# Patient Record
Sex: MALE | Race: WHITE | NOT HISPANIC OR LATINO | Employment: OTHER | URBAN - METROPOLITAN AREA
[De-identification: names, ages, dates, MRNs, and addresses within clinical notes are randomized per-mention and may not be internally consistent; named-entity substitution may affect disease eponyms.]

---

## 2019-12-02 ENCOUNTER — HOSPITAL ENCOUNTER (EMERGENCY)
Facility: CLINIC | Age: 74
Discharge: HOME | End: 2019-12-02
Attending: EMERGENCY MEDICINE | Admitting: EMERGENCY MEDICINE
Payer: MEDICARE

## 2019-12-02 VITALS
WEIGHT: 140 LBS | BODY MASS INDEX: 20.04 KG/M2 | DIASTOLIC BLOOD PRESSURE: 101 MMHG | OXYGEN SATURATION: 98 % | SYSTOLIC BLOOD PRESSURE: 152 MMHG | TEMPERATURE: 98.3 F | HEIGHT: 70 IN | HEART RATE: 100 BPM | RESPIRATION RATE: 14 BRPM

## 2019-12-02 DIAGNOSIS — R45.1 AGITATION: ICD-10-CM

## 2019-12-02 PROCEDURE — 99285 EMERGENCY DEPT VISIT HI MDM: CPT

## 2019-12-02 ASSESSMENT — MIFFLIN-ST. JEOR: SCORE: 1371.29

## 2019-12-02 NOTE — ED TRIAGE NOTES
Pt arrived via EMS after an altercation at a bar. EMS reports pt called police but when police arrived he fled in vehicle. Reports when stopped his behavior became violent and unpredictable. Wife reports pt has Parkinson's disease and that his behavior has been becoming more unpredictable and violent. Pt has red marks to bilat wrists due to handcuffs. Pt seems very agitated and upset with being at the hospital and with the police.

## 2019-12-02 NOTE — ED NOTES
Pt's wife, Caren, called requesting an update. States she will be coming once she can arrange a ride with her daughter. Phone number [phone].

## 2019-12-02 NOTE — ED PROVIDER NOTES
"  History     Chief Complaint:  Altercation w/ the police           Johny Renee is a 74 year old male who presents to the emergency department for evaluation of altercation with the police. The patient states that the police brought him here. He does not want to be at the hospital and states that he was brought here involuntarily. He states that he was out driving and was at [employer]. He states that he had not drank or ate there. He denies an altercation with police or employees at [employer]. He states that they asked his wife to leave and would not serve them. He reports that he dialed 911 to report [employer].     Allergies:  Simvastatin    Medications:    Aspirin  Glyburide  Microzide  Lisinopril  Metformin  Micronase  Glucophage  Prinizide    Past Medical History:    Hypertension  Diabetes mellitus  Vitamin D deficiency  Shoulder joint pain  Hyperlipidemia  Herniated disc    Past Surgical History:    Disc surgery x2  Nasal polyp removal several times  Vasectomy    Family History:    No past pertinent family history.    Social History:  The patient presents today alone.  Former smoker  Positive for alcohol use.   Negative for drug use.  Marital Status:      The patient did not want to comply.    Review of Systems   Unable to perform ROS: Other   All other systems reviewed and are negative.        Physical Exam     Patient Vitals for the past 24 hrs:   BP Temp Temp src Pulse Heart Rate Resp SpO2 Height Weight   12/02/19 1700 (!) 152/101 -- -- 100 -- -- -- -- --   12/02/19 1658 -- -- -- -- -- -- 98 % -- --   12/02/19 1644 (!) 186/91 98.3  F (36.8  C) Oral 103 103 14 99 % 1.778 m (5' 10\") 63.5 kg (140 lb)     Physical Exam  Constitutional: Agitated. Belligerent. Yelling hallway at staff. Security at doorway.  Head: Atraumatic.  Mouth/Throat: Oropharynx is clear and moist. No oropharyngeal exudate.  Eyes: Conjunctivae and EOM are normal. No scleral icterus.  Neck: Normal range of motion. Neck supple. "   Cardiovascular: Normal rate, regular rhythm, normal heart sounds and intact distal pulses.   Pulmonary/Chest: Breath sounds normal. No respiratory distress.  Abdominal: Soft. Bowel sounds are normal. No distension. No tenderness. No rebound or guarding.   Musculoskeletal: Normal range of motion. No edema or tenderness.   Neurological: Patient is ambulatory. Does an intention and resting tremor. Appears oriented to person. Will not answer questions to place or time. Has delusional none goal oriented and very agitated answering to questions. Unable to give a reasonable timeline or historical line of events that led to his EMS ER hospitalization.  Skin: Warm and dry. No rash noted. Not diaphoretic.     Emergency Department Course     Emergency Department Course:     1748 Nursing notes and vitals reviewed.    1755 I performed an exam of the patient as documented above.     1934 Patient rechecked and updated.     Findings and plan explained to the Patient. Patient discharged home with instructions regarding supportive care, medications, and reasons to return. The importance of close follow-up was reviewed.     Impression & Plan     Medical Decision Making:  Johny Renee is a 74 year old male who presents to the emergency department today on policed hold for bizarre belligerent behavior and non compliance.  After de escalation and 360 degree evaluation form EMS police and patient wife and daughter it appears has history of being removed form [facility] in the past, was recognized again with wife, had verbal altercation with [facility] staff, called 911, a slow speed pursuit occurred, was out of control with arresting officers, wife states that he may be having a medical condition to avoid arrest and EMS brought him here.  He is refusing anything and out of control at presentation but after some time and calming he is truthful in the occurrences of the evening and on recheck with him and family agree no complaints,  completely safe and ok for home.l  Family assumes care.       Discharge Diagnosis:    ICD-10-CM    1. Agitation R45.1      Disposition:  The patient is discharged to home.    Scribe Disclosure:  I, Marvin Pardo, am serving as a scribe at 6:19 PM on 12/2/2019 to document services personally performed by Lius Mendenhall MD based on my observations and the provider's statements to me.      Luis Mendenhall MD  12/03/19 0029

## 2019-12-02 NOTE — ED AVS SNAPSHOT
[facility] Emergency Department  [address]  [facility] [address]  Phone:  [phone]  Fax:  [phone]                                    Johny Renee   MRN: [id number]    Department:  [facility] Emergency Department   Date of Visit:  12/2/2019           After Visit Summary Signature Page    I have received my discharge instructions, and my questions have been answered. I have discussed any challenges I see with this plan with the nurse or doctor.    ..........................................................................................................................................  Patient/Patient Representative Signature      ..........................................................................................................................................  Patient Representative Print Name and Relationship to Patient    ..................................................               ................................................  Date                                   Time    ..........................................................................................................................................  Reviewed by Signature/Title    ...................................................              ..............................................  Date                                               Time          22EPIC Rev 08/18

## 2019-12-03 NOTE — ED NOTES
"Pt. Became aggressive with staff stating \"If I don't see the doctor in the next ten minutes I am leaving\" pt. Was redirected into room with security and nursing staff. Pt. Became increasingly verbally aggressive with MD Mendenhall. MD mendenhall calmly redirected pt. Into room.  "

## 2020-01-01 ENCOUNTER — TRANSFERRED RECORDS (OUTPATIENT)
Dept: MULTI SPECIALTY CLINIC | Facility: CLINIC | Age: 75
End: 2020-01-01

## 2020-01-01 LAB — RETINOPATHY: NORMAL

## 2020-02-01 ENCOUNTER — HOSPITAL ENCOUNTER (INPATIENT)
Facility: CLINIC | Age: 75
LOS: 1 days | Discharge: HOME HEALTH | DRG: 897 | End: 2020-02-04
Attending: EMERGENCY MEDICINE | Admitting: INTERNAL MEDICINE
Payer: MEDICARE

## 2020-02-01 ENCOUNTER — APPOINTMENT (OUTPATIENT)
Dept: GENERAL RADIOLOGY | Facility: CLINIC | Age: 75
DRG: 897 | End: 2020-02-01
Attending: EMERGENCY MEDICINE
Payer: MEDICARE

## 2020-02-01 DIAGNOSIS — F10.920 ALCOHOLIC INTOXICATION WITHOUT COMPLICATION (H): ICD-10-CM

## 2020-02-01 DIAGNOSIS — R26.89 SHUFFLING GAIT: ICD-10-CM

## 2020-02-01 DIAGNOSIS — E87.20 LACTIC ACIDOSIS: ICD-10-CM

## 2020-02-01 DIAGNOSIS — I21.4 NSTEMI (NON-ST ELEVATED MYOCARDIAL INFARCTION) (H): ICD-10-CM

## 2020-02-01 DIAGNOSIS — J02.0 STREP THROAT: Primary | ICD-10-CM

## 2020-02-01 DIAGNOSIS — E86.0 DEHYDRATION: ICD-10-CM

## 2020-02-01 LAB
ALBUMIN SERPL-MCNC: 3.6 G/DL (ref 3.4–5)
ALCOHOL BREATH TEST: 0.11 (ref 0–0.01)
ALP SERPL-CCNC: 47 U/L (ref 40–150)
ALT SERPL W P-5'-P-CCNC: 30 U/L (ref 0–70)
ANION GAP SERPL CALCULATED.3IONS-SCNC: 12 MMOL/L (ref 3–14)
AST SERPL W P-5'-P-CCNC: 18 U/L (ref 0–45)
BASE DEFICIT BLDV-SCNC: 8.7 MMOL/L
BASOPHILS # BLD AUTO: 0 10E9/L (ref 0–0.2)
BASOPHILS NFR BLD AUTO: 0.3 %
BILIRUB SERPL-MCNC: 0.2 MG/DL (ref 0.2–1.3)
BUN SERPL-MCNC: 30 MG/DL (ref 7–30)
CALCIUM SERPL-MCNC: 8.8 MG/DL (ref 8.5–10.1)
CHLORIDE SERPL-SCNC: 114 MMOL/L (ref 94–109)
CO2 SERPL-SCNC: 16 MMOL/L (ref 20–32)
CREAT SERPL-MCNC: 1.73 MG/DL (ref 0.66–1.25)
DIFFERENTIAL METHOD BLD: ABNORMAL
EOSINOPHIL # BLD AUTO: 0 10E9/L (ref 0–0.7)
EOSINOPHIL NFR BLD AUTO: 0 %
ERYTHROCYTE [DISTWIDTH] IN BLOOD BY AUTOMATED COUNT: 12.4 % (ref 10–15)
ETHANOL SERPL-MCNC: 0.1 G/DL
GFR SERPL CREATININE-BSD FRML MDRD: 37 ML/MIN/{1.73_M2}
GLUCOSE SERPL-MCNC: 194 MG/DL (ref 70–99)
HCO3 BLDV-SCNC: 17 MMOL/L (ref 21–28)
HCT VFR BLD AUTO: 38.7 % (ref 40–53)
HGB BLD-MCNC: 12.6 G/DL (ref 13.3–17.7)
IMM GRANULOCYTES # BLD: 0.1 10E9/L (ref 0–0.4)
IMM GRANULOCYTES NFR BLD: 0.6 %
LACTATE SERPL-SCNC: 5.5 MMOL/L (ref 0.4–2)
LYMPHOCYTES # BLD AUTO: 0.9 10E9/L (ref 0.8–5.3)
LYMPHOCYTES NFR BLD AUTO: 7 %
MCH RBC QN AUTO: 31.1 PG (ref 26.5–33)
MCHC RBC AUTO-ENTMCNC: 32.6 G/DL (ref 31.5–36.5)
MCV RBC AUTO: 96 FL (ref 78–100)
MONOCYTES # BLD AUTO: 0.6 10E9/L (ref 0–1.3)
MONOCYTES NFR BLD AUTO: 4.7 %
NEUTROPHILS # BLD AUTO: 11.7 10E9/L (ref 1.6–8.3)
NEUTROPHILS NFR BLD AUTO: 87.4 %
NRBC # BLD AUTO: 0 10*3/UL
NRBC BLD AUTO-RTO: 0 /100
O2/TOTAL GAS SETTING VFR VENT: ABNORMAL %
OXYHGB MFR BLDV: 74 %
PCO2 BLDV: 34 MM HG (ref 40–50)
PH BLDV: 7.3 PH (ref 7.32–7.43)
PLATELET # BLD AUTO: 256 10E9/L (ref 150–450)
PO2 BLDV: 43 MM HG (ref 25–47)
POTASSIUM SERPL-SCNC: 5 MMOL/L (ref 3.4–5.3)
PROT SERPL-MCNC: 6.7 G/DL (ref 6.8–8.8)
RBC # BLD AUTO: 4.05 10E12/L (ref 4.4–5.9)
SODIUM SERPL-SCNC: 142 MMOL/L (ref 133–144)
TROPONIN I SERPL-MCNC: 0.06 UG/L (ref 0–0.04)
WBC # BLD AUTO: 13.4 10E9/L (ref 4–11)

## 2020-02-01 PROCEDURE — HZ2ZZZZ DETOXIFICATION SERVICES FOR SUBSTANCE ABUSE TREATMENT: ICD-10-PCS | Performed by: INTERNAL MEDICINE

## 2020-02-01 PROCEDURE — 25000128 H RX IP 250 OP 636

## 2020-02-01 PROCEDURE — 93005 ELECTROCARDIOGRAM TRACING: CPT | Mod: 76

## 2020-02-01 PROCEDURE — G0378 HOSPITAL OBSERVATION PER HR: HCPCS

## 2020-02-01 PROCEDURE — 80320 DRUG SCREEN QUANTALCOHOLS: CPT | Performed by: EMERGENCY MEDICINE

## 2020-02-01 PROCEDURE — 80053 COMPREHEN METABOLIC PANEL: CPT | Performed by: EMERGENCY MEDICINE

## 2020-02-01 PROCEDURE — 96361 HYDRATE IV INFUSION ADD-ON: CPT

## 2020-02-01 PROCEDURE — 82805 BLOOD GASES W/O2 SATURATION: CPT | Performed by: EMERGENCY MEDICINE

## 2020-02-01 PROCEDURE — 99285 EMERGENCY DEPT VISIT HI MDM: CPT | Mod: 25

## 2020-02-01 PROCEDURE — 99220 ZZC INITIAL OBSERVATION CARE,LEVL III: CPT | Performed by: INTERNAL MEDICINE

## 2020-02-01 PROCEDURE — 85025 COMPLETE CBC W/AUTO DIFF WBC: CPT | Performed by: EMERGENCY MEDICINE

## 2020-02-01 PROCEDURE — 96374 THER/PROPH/DIAG INJ IV PUSH: CPT

## 2020-02-01 PROCEDURE — 82075 ASSAY OF BREATH ETHANOL: CPT

## 2020-02-01 PROCEDURE — 84484 ASSAY OF TROPONIN QUANT: CPT | Performed by: EMERGENCY MEDICINE

## 2020-02-01 PROCEDURE — 83605 ASSAY OF LACTIC ACID: CPT | Performed by: EMERGENCY MEDICINE

## 2020-02-01 PROCEDURE — 96372 THER/PROPH/DIAG INJ SC/IM: CPT

## 2020-02-01 PROCEDURE — 71045 X-RAY EXAM CHEST 1 VIEW: CPT

## 2020-02-01 PROCEDURE — 25000128 H RX IP 250 OP 636: Performed by: EMERGENCY MEDICINE

## 2020-02-01 PROCEDURE — 25800030 ZZH RX IP 258 OP 636: Performed by: EMERGENCY MEDICINE

## 2020-02-01 PROCEDURE — 93005 ELECTROCARDIOGRAM TRACING: CPT

## 2020-02-01 RX ORDER — ASPIRIN 325 MG
325 TABLET ORAL ONCE
Status: DISCONTINUED | OUTPATIENT
Start: 2020-02-01 | End: 2020-02-02

## 2020-02-01 RX ORDER — LORAZEPAM 2 MG/ML
INJECTION INTRAMUSCULAR
Status: COMPLETED
Start: 2020-02-01 | End: 2020-02-01

## 2020-02-01 RX ORDER — LORAZEPAM 2 MG/ML
1 INJECTION INTRAMUSCULAR ONCE
Status: DISCONTINUED | OUTPATIENT
Start: 2020-02-01 | End: 2020-02-01

## 2020-02-01 RX ORDER — OLANZAPINE 10 MG/2ML
10 INJECTION, POWDER, FOR SOLUTION INTRAMUSCULAR ONCE
Status: COMPLETED | OUTPATIENT
Start: 2020-02-01 | End: 2020-02-01

## 2020-02-01 RX ORDER — LORAZEPAM 2 MG/ML
2 INJECTION INTRAMUSCULAR ONCE
Status: COMPLETED | OUTPATIENT
Start: 2020-02-01 | End: 2020-02-01

## 2020-02-01 RX ORDER — SODIUM CHLORIDE 9 MG/ML
1000 INJECTION, SOLUTION INTRAVENOUS CONTINUOUS
Status: DISCONTINUED | OUTPATIENT
Start: 2020-02-01 | End: 2020-02-02

## 2020-02-01 RX ADMIN — SODIUM CHLORIDE 1000 ML: 9 INJECTION, SOLUTION INTRAVENOUS at 19:52

## 2020-02-01 RX ADMIN — LORAZEPAM 2 MG: 2 INJECTION INTRAMUSCULAR; INTRAVENOUS at 21:34

## 2020-02-01 RX ADMIN — LORAZEPAM 2 MG: 2 INJECTION INTRAMUSCULAR at 18:17

## 2020-02-01 RX ADMIN — LORAZEPAM 2 MG: 2 INJECTION INTRAMUSCULAR; INTRAVENOUS at 18:17

## 2020-02-01 RX ADMIN — SODIUM CHLORIDE, POTASSIUM CHLORIDE, SODIUM LACTATE AND CALCIUM CHLORIDE 1000 ML: 600; 310; 30; 20 INJECTION, SOLUTION INTRAVENOUS at 21:34

## 2020-02-01 RX ADMIN — OLANZAPINE 10 MG: 10 INJECTION, POWDER, FOR SOLUTION INTRAMUSCULAR at 18:00

## 2020-02-01 NOTE — ED TRIAGE NOTES
Pt refusing to answer questions during triage. Pt arrived via EMS for evaluation of ETOH intoxication and aggressive behavior. Pt yelling and swearing at security and RN. Pt arrives from [facility] where police called for an intoxicated male causing a disturbance.

## 2020-02-01 NOTE — ED PROVIDER NOTES
History     Chief Complaint:  Alcohol Intoxication      HPI   Johny Renee is a 74 year old male who presents for evaluation of alcohol intoxication. Per EMS patient was at [facility] in [place] drinking with his wife and became increasingly agitated and aggressive, making his wife feel unsafe. Police was called at the patient was causing a disturbance. Patient arrives to the ED agitated, uncooperative, and unwilling to receive care. He denies being intoxicated.     Allergies:  No known drug allergies    Medications:    Aspirin  Glyburide  Hydrochlorothiazide  Lisinopril  Metformin  Vitamin D    Past Medical History:    DM type 2  Vitamin D deficiency  Hyperlipidemia  HTN    Past Surgical History:    Herniated disc surgery x2  Nasal polyp removal   Vasectomy    Family History:    History reviewed. No pertinent family history.     Social History:  Smoking status: former smoker  Alcohol use: yes  Drug use: no  The patient presents to the emergency department via EMS.  PCP: Joy Maya  Marital Status:       Review of Systems   Unable to perform ROS: Other (Uncooperative)       Physical Exam     Patient Vitals for the past 24 hrs:   BP Temp Temp src Pulse Heart Rate Resp SpO2   02/01/20 2134 -- 99  F (37.2  C) Tympanic -- -- -- --   02/01/20 2100 (!) 150/105 -- -- 129 126 27 93 %   02/01/20 2030 (!) 142/75 -- -- 114 -- 28 92 %   02/01/20 2020 -- -- -- -- 115 28 92 %   02/01/20 2015 -- -- -- -- 115 27 93 %   02/01/20 2010 -- -- -- -- 114 25 92 %   02/01/20 2005 -- -- -- -- 128 18 93 %   02/01/20 2000 134/71 -- -- 111 106 -- 92 %   02/01/20 1955 (!) 152/74 -- -- 111 112 28 92 %   02/01/20 1950 -- -- -- -- 113 -- 93 %   02/01/20 1945 -- -- -- -- 116 30 92 %   02/01/20 1940 -- -- -- -- 118 28 92 %   02/01/20 1935 -- -- -- -- 125 (!) 33 92 %   02/01/20 1930 -- -- -- -- 122 30 93 %   02/01/20 1925 -- -- -- -- 115 -- 93 %   02/01/20 1920 -- -- -- -- -- -- 92 %   02/01/20 1915 -- -- -- -- -- -- 92 %    02/01/20 1910 -- -- -- -- -- -- 93 %   02/01/20 1907 -- -- -- -- -- -- (!) 89 %   02/01/20 1905 -- -- -- -- -- -- 92 %   02/01/20 1900 -- -- -- -- -- -- 90 %   02/01/20 1615 (!) 132/93 -- -- 128 -- -- 97 %       Physical Exam  Constitutional:       General: He is in acute distress.      Appearance: He is well-developed.      Comments: Yelling profanity and threatening staff   HENT:      Head: Atraumatic.      Right Ear: External ear normal.      Left Ear: External ear normal.      Mouth/Throat:      Mouth: Mucous membranes are moist.      Pharynx: Oropharynx is clear. No oropharyngeal exudate.   Eyes:      General: No scleral icterus.     Conjunctiva/sclera: Conjunctivae normal.      Pupils: Pupils are equal, round, and reactive to light.   Neck:      Musculoskeletal: Normal range of motion.   Cardiovascular:      Rate and Rhythm: Regular rhythm. Tachycardia present.      Heart sounds: Normal heart sounds. No murmur. No friction rub. No gallop.    Pulmonary:      Effort: Pulmonary effort is normal. No respiratory distress.      Breath sounds: Normal breath sounds. No wheezing or rales.   Abdominal:      General: Bowel sounds are normal. There is no distension.      Palpations: Abdomen is soft. There is no mass.      Tenderness: There is no abdominal tenderness.   Skin:     General: Skin is warm and dry.      Findings: No rash.   Neurological:      Mental Status: He is alert.   Psychiatric:      Comments: Agitated, not cooperative, belligerent          Emergency Department Course   ECG (19:19:45):  Rate 118 bpm. TX interval 152. QRS duration 82. QT/QTc 314/440. P-R-T axes 51 70 32. Sinus tachycardia with premature atrial complexes. Nonspecific ST abnormality. Abnormal ECG. Interpreted at 1922 by Anirudh Luna MD.    ECG (20:32:47):  Rate 112 bpm. TX interval 146. QRS duration 84. QT/QTc 316/431. P-R-T axes 50 61 47. Sinus tachycardia with premature atrial complexes. Cannot rule out inferior infarct, age  undetermined. Abnormal ECG. Interpreted at 2035 by Anirudh Luna MD.      Laboratory:  CBC: WBC: 13.4 (H), HGB: 12.6 (L), PLT: 256  CMP: Glucose 194 (H), Chloride 4 (H), Carbon dioxide 16 (L), GFR 37 (L), Protein total 6.7 (L), o/w WNL (Creatinine: 1.73 (H))  Alcohol ethyl: 0.10 (H)  1927 Troponin: 0.058 (H)  Alcohol breath test POCT: 0.108  VBG and oxyhgb: pH 7.30 (L) / PCO2 34 (L) / PO2 43 / Bicarb 17 (L) / FlO2 2L NC / Oxyhemoglobin 74 / Base excess 8.7      Interventions:  1800 Zyprexa 10 mg IM  1817 Ativan 2 mg IM  1952 NS 1000 mL IV  1 L LR and 2mg IV ativan    Emergency Department Course:  Nursing notes and vitals reviewed. (1630) I performed an exam of the patient as documented above.     IMedicine administered as documented above. Blood drawn. This was sent to the lab for further testing, results above.     An EKG was recorded. Results as noted above.     1838 I rechecked the patient. Patient is sleeping.    2102 I rechecked the patient. Patient refused aspirin.     2103 I called the patient's wife.     2121  I consulted with Dr. Reynaga of the hospitalist services. They are in agreement to accept the patient for admission.    Findings and plan explained to the spouse who consents to admission. Discussed the patient with Dr. Reynaga, who will admit the patient to a ICU bed for further monitoring, evaluation, and treatment.    Impression & Plan    Medical Decision Making:  This patient presents from a restaurant for alcohol intoxication and being belligerent. Patient in uncooperative here and would not give us any information. He kept using profanity even after we asked him not to. He refused all intervention. It appears that when he was here a month ago the same thing happened. They just observed him until his family came to pick him up. We are trying to contact family and ask if they could come pick him up later. During our observation he became more belligerent and for our safety and his safety we  administered IM medication and the patient was put in restraints. He did calm down and was sedated somewhat. We tried to reassess him multiple times but he was still uncooperative. We eventually did get blood drawn and establish an IV. He was tachycardic the whole time so we did an EKG that showed some ST depression. He refused aspirin after we attempted to discuss in detail his elevated troponin. I talked to his wife again to update her and she tells me that his aggression is baseline and that he will not take any medication we offer him, therefore this is not new. She does not indicate that he is a chronic drinker and did say that he diagnosed with Parkinsons. She does want him admitted and be evaluated for EKG changes and elevated troponin. She is in agreement with our plan to admit to ICU due to behavioral concerns and she will be coming to see him and she understands what we have done here and has no concerns about any of our interventions so far.     Diagnosis:    ICD-10-CM    1. Alcoholic intoxication without complication (H) F10.920    2. Dehydration E86.0    3. NSTEMI (non-ST elevated myocardial infarction) (H) I21.4      Critical Care time: 30 minutes excluding procedures    Disposition:  Admitted to ICU  Chema Hooks  2/1/2020   [facility] EMERGENCY DEPARTMENT  Scribe Disclosure:  I, Chema Hooks, am serving as a scribe at 4:30 PM on 2/1/2020 to document services personally performed by Aniruhd Luna MD based on my observations and the provider's statements to me.        Anirudh Luna MD  02/01/20 3249

## 2020-02-01 NOTE — ED NOTES
Bed: ED06  Expected date: 2/1/20  Expected time: 4:13 PM  Means of arrival: Ambulance  Comments:  A515 68M ETOH

## 2020-02-02 ENCOUNTER — APPOINTMENT (OUTPATIENT)
Dept: CARDIOLOGY | Facility: CLINIC | Age: 75
DRG: 897 | End: 2020-02-02
Attending: INTERNAL MEDICINE
Payer: MEDICARE

## 2020-02-02 PROBLEM — F10.929 ALCOHOL INTOXICATION (H): Status: ACTIVE | Noted: 2020-02-02

## 2020-02-02 LAB
ALBUMIN SERPL-MCNC: 3.3 G/DL (ref 3.4–5)
ALP SERPL-CCNC: 48 U/L (ref 40–150)
ALT SERPL W P-5'-P-CCNC: 30 U/L (ref 0–70)
ANION GAP SERPL CALCULATED.3IONS-SCNC: 7 MMOL/L (ref 3–14)
AST SERPL W P-5'-P-CCNC: 43 U/L (ref 0–45)
BASOPHILS # BLD AUTO: 0.1 10E9/L (ref 0–0.2)
BASOPHILS NFR BLD AUTO: 0.4 %
BILIRUB SERPL-MCNC: 0.5 MG/DL (ref 0.2–1.3)
BUN SERPL-MCNC: 25 MG/DL (ref 7–30)
CALCIUM SERPL-MCNC: 8.6 MG/DL (ref 8.5–10.1)
CHLORIDE SERPL-SCNC: 115 MMOL/L (ref 94–109)
CO2 SERPL-SCNC: 22 MMOL/L (ref 20–32)
CREAT SERPL-MCNC: 1.38 MG/DL (ref 0.66–1.25)
DIFFERENTIAL METHOD BLD: ABNORMAL
EOSINOPHIL # BLD AUTO: 0.1 10E9/L (ref 0–0.7)
EOSINOPHIL NFR BLD AUTO: 1 %
ERYTHROCYTE [DISTWIDTH] IN BLOOD BY AUTOMATED COUNT: 12.5 % (ref 10–15)
GFR SERPL CREATININE-BSD FRML MDRD: 49 ML/MIN/{1.73_M2}
GLUCOSE BLDC GLUCOMTR-MCNC: 135 MG/DL (ref 70–99)
GLUCOSE BLDC GLUCOMTR-MCNC: 138 MG/DL (ref 70–99)
GLUCOSE BLDC GLUCOMTR-MCNC: 148 MG/DL (ref 70–99)
GLUCOSE BLDC GLUCOMTR-MCNC: 168 MG/DL (ref 70–99)
GLUCOSE BLDC GLUCOMTR-MCNC: 184 MG/DL (ref 70–99)
GLUCOSE BLDC GLUCOMTR-MCNC: 186 MG/DL (ref 70–99)
GLUCOSE SERPL-MCNC: 190 MG/DL (ref 70–99)
HBA1C MFR BLD: 7.1 % (ref 0–5.6)
HCT VFR BLD AUTO: 39.2 % (ref 40–53)
HGB BLD-MCNC: 12.7 G/DL (ref 13.3–17.7)
IMM GRANULOCYTES # BLD: 0.1 10E9/L (ref 0–0.4)
IMM GRANULOCYTES NFR BLD: 0.5 %
LACTATE BLD-SCNC: 1 MMOL/L (ref 0.7–2)
LACTATE BLD-SCNC: 1.8 MMOL/L (ref 0.7–2)
LACTATE SERPL-SCNC: 3.1 MMOL/L (ref 0.4–2)
LYMPHOCYTES # BLD AUTO: 2.1 10E9/L (ref 0.8–5.3)
LYMPHOCYTES NFR BLD AUTO: 16.7 %
MCH RBC QN AUTO: 31.2 PG (ref 26.5–33)
MCHC RBC AUTO-ENTMCNC: 32.4 G/DL (ref 31.5–36.5)
MCV RBC AUTO: 96 FL (ref 78–100)
MONOCYTES # BLD AUTO: 0.9 10E9/L (ref 0–1.3)
MONOCYTES NFR BLD AUTO: 6.8 %
MRSA DNA SPEC QL NAA+PROBE: NEGATIVE
NEUTROPHILS # BLD AUTO: 9.3 10E9/L (ref 1.6–8.3)
NEUTROPHILS NFR BLD AUTO: 74.6 %
NRBC # BLD AUTO: 0 10*3/UL
NRBC BLD AUTO-RTO: 0 /100
PLATELET # BLD AUTO: 219 10E9/L (ref 150–450)
POTASSIUM SERPL-SCNC: 4.4 MMOL/L (ref 3.4–5.3)
POTASSIUM SERPL-SCNC: 4.4 MMOL/L (ref 3.4–5.3)
PROT SERPL-MCNC: 6.2 G/DL (ref 6.8–8.8)
RBC # BLD AUTO: 4.07 10E12/L (ref 4.4–5.9)
SODIUM SERPL-SCNC: 144 MMOL/L (ref 133–144)
SPECIMEN SOURCE: NORMAL
TROPONIN I SERPL-MCNC: 1.11 UG/L (ref 0–0.04)
TROPONIN I SERPL-MCNC: 1.27 UG/L (ref 0–0.04)
WBC # BLD AUTO: 12.5 10E9/L (ref 4–11)

## 2020-02-02 PROCEDURE — 99207 ZZC CDG-CODE CATEGORY CHANGED: CPT | Performed by: INTERNAL MEDICINE

## 2020-02-02 PROCEDURE — 25000125 ZZHC RX 250: Performed by: INTERNAL MEDICINE

## 2020-02-02 PROCEDURE — 00000146 ZZHCL STATISTIC GLUCOSE BY METER IP

## 2020-02-02 PROCEDURE — 93306 TTE W/DOPPLER COMPLETE: CPT | Mod: 26 | Performed by: INTERNAL MEDICINE

## 2020-02-02 PROCEDURE — 36415 COLL VENOUS BLD VENIPUNCTURE: CPT | Performed by: INTERNAL MEDICINE

## 2020-02-02 PROCEDURE — 83605 ASSAY OF LACTIC ACID: CPT | Performed by: INTERNAL MEDICINE

## 2020-02-02 PROCEDURE — 83036 HEMOGLOBIN GLYCOSYLATED A1C: CPT | Performed by: INTERNAL MEDICINE

## 2020-02-02 PROCEDURE — 25000128 H RX IP 250 OP 636: Performed by: INTERNAL MEDICINE

## 2020-02-02 PROCEDURE — 40000264 ECHOCARDIOGRAM COMPLETE

## 2020-02-02 PROCEDURE — 80053 COMPREHEN METABOLIC PANEL: CPT | Performed by: INTERNAL MEDICINE

## 2020-02-02 PROCEDURE — G0378 HOSPITAL OBSERVATION PER HR: HCPCS

## 2020-02-02 PROCEDURE — 40000916 ZZH STATISTIC SITTER, NIGHT HOURS

## 2020-02-02 PROCEDURE — 85025 COMPLETE CBC W/AUTO DIFF WBC: CPT | Performed by: INTERNAL MEDICINE

## 2020-02-02 PROCEDURE — 25500064 ZZH RX 255 OP 636: Performed by: INTERNAL MEDICINE

## 2020-02-02 PROCEDURE — 87641 MR-STAPH DNA AMP PROBE: CPT | Performed by: INTERNAL MEDICINE

## 2020-02-02 PROCEDURE — 99226 ZZC SUBSEQUENT OBSERVATION CARE,LEVEL III: CPT | Performed by: INTERNAL MEDICINE

## 2020-02-02 PROCEDURE — 25000132 ZZH RX MED GY IP 250 OP 250 PS 637: Mod: GY | Performed by: INTERNAL MEDICINE

## 2020-02-02 PROCEDURE — 40000914 ZZH STATISTIC SITTER, DAY HOURS

## 2020-02-02 PROCEDURE — 84484 ASSAY OF TROPONIN QUANT: CPT | Performed by: INTERNAL MEDICINE

## 2020-02-02 PROCEDURE — 25800030 ZZH RX IP 258 OP 636: Performed by: INTERNAL MEDICINE

## 2020-02-02 PROCEDURE — 87640 STAPH A DNA AMP PROBE: CPT | Performed by: INTERNAL MEDICINE

## 2020-02-02 PROCEDURE — 84132 ASSAY OF SERUM POTASSIUM: CPT | Performed by: INTERNAL MEDICINE

## 2020-02-02 RX ORDER — ASPIRIN 300 MG/1
300 SUPPOSITORY RECTAL DAILY
Status: DISCONTINUED | OUTPATIENT
Start: 2020-02-03 | End: 2020-02-04 | Stop reason: HOSPADM

## 2020-02-02 RX ORDER — ACETAMINOPHEN 325 MG/1
650 TABLET ORAL EVERY 4 HOURS PRN
Status: DISCONTINUED | OUTPATIENT
Start: 2020-02-02 | End: 2020-02-04 | Stop reason: HOSPADM

## 2020-02-02 RX ORDER — LORAZEPAM 1 MG/1
1-2 TABLET ORAL EVERY 30 MIN PRN
Status: DISCONTINUED | OUTPATIENT
Start: 2020-02-02 | End: 2020-02-04

## 2020-02-02 RX ORDER — THIAMINE HYDROCHLORIDE 100 MG/ML
100 INJECTION, SOLUTION INTRAMUSCULAR; INTRAVENOUS DAILY
Status: DISCONTINUED | OUTPATIENT
Start: 2020-02-07 | End: 2020-02-04

## 2020-02-02 RX ORDER — ACETAMINOPHEN 650 MG/1
650 SUPPOSITORY RECTAL EVERY 4 HOURS PRN
Status: DISCONTINUED | OUTPATIENT
Start: 2020-02-02 | End: 2020-02-04 | Stop reason: HOSPADM

## 2020-02-02 RX ORDER — SODIUM CHLORIDE 9 MG/ML
INJECTION, SOLUTION INTRAVENOUS CONTINUOUS
Status: DISCONTINUED | OUTPATIENT
Start: 2020-02-02 | End: 2020-02-04

## 2020-02-02 RX ORDER — ONDANSETRON 4 MG/1
4 TABLET, ORALLY DISINTEGRATING ORAL EVERY 6 HOURS PRN
Status: DISCONTINUED | OUTPATIENT
Start: 2020-02-02 | End: 2020-02-04 | Stop reason: HOSPADM

## 2020-02-02 RX ORDER — ONDANSETRON 2 MG/ML
4 INJECTION INTRAMUSCULAR; INTRAVENOUS EVERY 6 HOURS PRN
Status: DISCONTINUED | OUTPATIENT
Start: 2020-02-02 | End: 2020-02-04 | Stop reason: HOSPADM

## 2020-02-02 RX ORDER — ASPIRIN 300 MG/1
300 SUPPOSITORY RECTAL ONCE
Status: COMPLETED | OUTPATIENT
Start: 2020-02-02 | End: 2020-02-02

## 2020-02-02 RX ORDER — MAGNESIUM SULFATE HEPTAHYDRATE 40 MG/ML
4 INJECTION, SOLUTION INTRAVENOUS EVERY 4 HOURS PRN
Status: DISCONTINUED | OUTPATIENT
Start: 2020-02-02 | End: 2020-02-04 | Stop reason: HOSPADM

## 2020-02-02 RX ORDER — METOPROLOL TARTRATE 1 MG/ML
2.5 INJECTION, SOLUTION INTRAVENOUS EVERY 6 HOURS
Status: DISCONTINUED | OUTPATIENT
Start: 2020-02-02 | End: 2020-02-04

## 2020-02-02 RX ORDER — DEXTROSE MONOHYDRATE 25 G/50ML
25-50 INJECTION, SOLUTION INTRAVENOUS
Status: DISCONTINUED | OUTPATIENT
Start: 2020-02-02 | End: 2020-02-04 | Stop reason: HOSPADM

## 2020-02-02 RX ORDER — NALOXONE HYDROCHLORIDE 0.4 MG/ML
.1-.4 INJECTION, SOLUTION INTRAMUSCULAR; INTRAVENOUS; SUBCUTANEOUS
Status: DISCONTINUED | OUTPATIENT
Start: 2020-02-02 | End: 2020-02-04 | Stop reason: HOSPADM

## 2020-02-02 RX ORDER — LORAZEPAM 2 MG/ML
1-2 INJECTION INTRAMUSCULAR EVERY 30 MIN PRN
Status: DISCONTINUED | OUTPATIENT
Start: 2020-02-02 | End: 2020-02-04

## 2020-02-02 RX ORDER — NICOTINE POLACRILEX 4 MG
15-30 LOZENGE BUCCAL
Status: DISCONTINUED | OUTPATIENT
Start: 2020-02-02 | End: 2020-02-04 | Stop reason: HOSPADM

## 2020-02-02 RX ADMIN — HUMAN ALBUMIN MICROSPHERES AND PERFLUTREN 3 ML: 10; .22 INJECTION, SOLUTION INTRAVENOUS at 09:00

## 2020-02-02 RX ADMIN — SODIUM CHLORIDE: 9 INJECTION, SOLUTION INTRAVENOUS at 22:42

## 2020-02-02 RX ADMIN — FOLIC ACID: 5 INJECTION, SOLUTION INTRAMUSCULAR; INTRAVENOUS; SUBCUTANEOUS at 02:05

## 2020-02-02 RX ADMIN — ASPIRIN 300 MG: 300 SUPPOSITORY RECTAL at 18:09

## 2020-02-02 RX ADMIN — LORAZEPAM 1 MG: 2 INJECTION INTRAMUSCULAR; INTRAVENOUS at 03:30

## 2020-02-02 RX ADMIN — METOPROLOL TARTRATE 2.5 MG: 5 INJECTION INTRAVENOUS at 18:13

## 2020-02-02 RX ADMIN — LORAZEPAM 1 MG: 2 INJECTION INTRAMUSCULAR; INTRAVENOUS at 06:47

## 2020-02-02 RX ADMIN — LORAZEPAM 1 MG: 2 INJECTION INTRAMUSCULAR; INTRAVENOUS at 05:39

## 2020-02-02 RX ADMIN — LORAZEPAM 2 MG: 2 INJECTION INTRAMUSCULAR; INTRAVENOUS at 22:52

## 2020-02-02 RX ADMIN — LORAZEPAM 2 MG: 2 INJECTION INTRAMUSCULAR; INTRAVENOUS at 01:24

## 2020-02-02 RX ADMIN — SODIUM CHLORIDE: 9 INJECTION, SOLUTION INTRAVENOUS at 01:53

## 2020-02-02 NOTE — ED NOTES
Patient refused to get changed out of his clothing. Patient patted down my security. pts belt, wallet, and keys placed in locker 46.

## 2020-02-02 NOTE — ED NOTES
RN called code green due to patient starting to yell at RN and security when trying to talk to him about the plan as well as the phone call the nurse had with patients wife.

## 2020-02-02 NOTE — ED NOTES
"RN went into patients room to given aspirin, patient refused stating. \"you cannot tell me if I am having a heart attack or not, you have no proof that I am having a heart attack my heart is perfectly fine. I am not fucking taking anything that you want me to. [place] and I have the freedom to make all of my decisions including what medications are going into my body.\" MD Luna notified  "

## 2020-02-02 NOTE — ED NOTES
RN called wife floresita for more information regarding today's events. Wife states that her  was at [facility] by himself and that she was out with a group of friends. Wife states that he was probably gone for a couple of hours prior to him arriving at the ER. Wife states that he was just here for agitation last week. Pt is normally unsteady on his feet due to his parkinsons which is confusing for some people because they believe that he is intoxicated. Wife unable to get to the hospital at this time due to them only having one car that is still at [facility] and her  has the keys with him. Wife states that their neighbor might be able to come and pick him up if able to be discharged. Wife would like to be called back with updates. MD Luna notified.

## 2020-02-02 NOTE — PROGRESS NOTES
[facility]  Hospitalist Progress Note  Emilie Macario MD 02/02/2020    Reason for Stay (Diagnosis): Belligerent and abusive behavior in the setting of alcohol intoxication         Assessment and Plan:      Summary of Stay: Johny Renee is a 74 year old male with a history of hypertension, type 2 diabetes, found on the ground outside of [facility] bar and restaurant.  Apparently the police called and he became belligerent and aggressive prompting EMS activation ultimately bring him in our emergency room.  In our emergency room he was aggressive and noncooperative, he was put in restraints, he was given multiple agents, him down including lorazepam and olanzapine, placed on a hold and admitted to the ICU on observation for close monitoring on 2/1/2020.    He has been intermittently belligerent and aggressive prompting more lorazepam, but has been calm and mostly sleepy over the course of today.  Admission labs were notable for a moderately elevated alcohol level of 0.10 (although not clear when his last drink was).    The wife is apparently a heavy alcoholic based on son and daughter report.  She does not even recall speaking with EMS last night but that is documented in their note.  She cannot recall how much he drank last night.    He has previously had a relatively heavy drinking problem head had a DUI several years ago.  Since that time he is significantly cut back on his drinking and he does not drink on a regular basis per son and daughter report.  He does however have an angry and belligerent nature in particular when it comes to the police.  He had an altercation with them in December 2019 also prompting an ER visit at which time he was released to family.    Problem List:   1. Acute alcohol intoxication: Resolved  2. Sedated: Secondary to multiple medications in the setting of belligerent and abusive and combative behavior at risk of hurting self.  3. Mental status: He is more cooperative today  but he still quite sedated, they were just can have to let him sleep it off, I do not believe that he needs a hold and so have discontinued that.  At this point time I think he just needs to wake up   4. Elevated troponin: Troponin was 0.058 and subsequently aria to 1.267.  Echocardiogram is without regional wall motion abnormalities.  This is in the setting of renal dysfunction with a BUN and creatinine of 30 and 1.73 on admission.  Difficult to interpret as I cannot get a reliable history from him.  EKGs without any acute changes, and shows sinus tachycardia with Q's in the inferior leads.  Echocardiogram was completed showing normal ejection fraction with borderline anteroapical hypokinesis.  If agreeable could pursue stress testing while in hospital if he leaves I would recommend getting a stress test in the outpatient setting.  Hard to know how much is renal dysfunction as part of this.  Start rectal aspirin, recheck trip in the morning  5. Elevated lactic acid: Likely due to metformin, acute kidney injury and adequate tissue perfusion-resolved after IV fluids  6. Kidney insufficiency of unclear duration: Admitting BUN/creatinine of 30 and 1.73, and have dropped to 25/1.4 with some IV fluids.  Recheck in the morning, continue to hold ACE inhibitor  7. Hypertension: PTA regimen with lisinopril 20 mg a day only, I felt think she has she off the drip she does goes up and down walking down up and I wonder what to do for her so she did not get any long-acting insulin today R 70 she does not go back and again okay only smokes at that she is still on the deep the drip that in setting of his kidney insufficiency.    8. Type 2 diabetes: On a combination of glyburide 5 mg daily and metformin 1 g p.o. twice daily.  Glyburide on hold due to minimal p.o. intake, and metformin on hold due to severe lactic acid elevation.  Hemoglobin A1c 7.1% which suggest reasonable home control  9. 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  I am happy to discuss it when he is in a more reasonable state  10. Shuffling gait: He is apparently been evaluated by a neurologist and does not have findings suggestive of Parkinson's disease.  I do wonder about a peripheral neuropathy in the setting of diabetes.  We need to do more thorough interview and patient better able to participate    DVT Prophylaxis: Holding on any anticoagulation and PCD's mainly because they might agitate him.  I like him to wake up and move around if possible  Code Status: Full Code  Functional Status: Typically independent, shuffles his gait  Diet: Moderate CHO diet when fully awake  Read: Not in place  Access: Single peripheral    Disposition Plan   Expected discharge in 1-2 days to prior living arrangement once clear sedating agents, a chance to discuss findings with patient and determine when stress testing should take place..     Entered: Emilie Macario 02/02/2020, 5:12 PM       Ok to transfer to cardiac tele        Interval History (Subjective):      Briefly wakes up to touch, slurring his speech but that with has been somewhat clearing through the course of today.  Although he still remains quite sedated and is been sleeping most of the day.  There is been no nausea or vomiting.  No further agitation                  Physical Exam:      Last Vital Signs:  BP (!) 177/88 (BP Location: Right arm)   Pulse 87   Temp 99.9  F (37.7  C) (Axillary)   Resp 18   Wt 97.8 kg (215 lb 9.8 oz)   SpO2 94%   BMI 30.94 kg/m      I/O: Inaccurate  Telemetry: NSR in the 90s  Weights: Not being tracked    In bed, sleeping comfortably.  Wakes up to voice and touch but only briefly.  Slurring his speech thinks he is at home in his bed.  Denies any pain heart is regular without murmurs rubs or gallops I do not appreciate any lower extremity edema his belly is soft and nontender lungs are clear to  auscultation he is moving around in bed on his own           Medications:      All current medications were reviewed with changes reflected in problem list.         Data:      All new lab and imaging data was reviewed.   Labs:  Recent Labs   Lab 02/02/20  0559      POTASSIUM 4.4   CHLORIDE 115*   CO2 22   ANIONGAP 7   *   BUN 25   CR 1.38*   GFRESTIMATED 49*   GFRESTBLACK 57*   TORY 8.6     Recent Labs   Lab 02/02/20  0559   WBC 12.5*   HGB 12.7*   HCT 39.2*   MCV 96      Troponin 0 0.058-> 1.110-> 1.267  Imaging:  Echocardiogram 2/2/2020  Interpretation Summary     The left ventricle is normal in size.  The visual ejection fraction is estimated at 60-65%.  No gross regional wall motion abnormalities except for a small amount of  borderline distal anteroapical hypokinesis.  No significant valvular heart disease.  _____________________________________________________________________________

## 2020-02-02 NOTE — ED NOTES
RN and several other staff members attempted to bargain with patient during code green to have patient get a sober ride home with his neighbor, sit in his room and metabolize the alcohol until he is under the legal limit and then he can be discharged or we can use medications to help him calm down and if needed will need to use restraints if he is not cooperative. As staff tried to release patient, patient attempted to grab onto staff. Staff again attempted to bargain with patient stating that we would need to give zyprexa if he continued to behave this way because it was unsafe for staff and himself. Patient was released from staff holding him down and staff left the room and patient got out of the bed and attempted to grab staff again. Patient was given zyprexa and retrained.

## 2020-02-02 NOTE — PLAN OF CARE
ICU End of Shift Summary.  For vital signs and complete assessments, please see documentation flowsheets.     Pertinent assessments:   Major Shift Events: Restraints removed.  Lethargic, disoriented, garbled speech, incoherent yet cooperative.  Sleeping shift.  Incontinent of urine.  Unable to swallow clear liquids.  CIWA 4.  Hold removed.  Attendant removed.    Plan (Upcoming Events): Start IV antihypertensive and  MD ASA.    Discharge/Transfer Needs:    Bedside Shift Report Completed :   Bedside Safety Check Completed:

## 2020-02-02 NOTE — PROGRESS NOTES
H+P dictated. Admitted with ETOH intoxication and unsafe behavior. No SI. Will continue hold initiated in ER.

## 2020-02-02 NOTE — PLAN OF CARE
Right wrist and Left wrist restraints initiated on patient on 2/2/2020 at 1:00 AM    Clinical Justification: Pulling lines, pulling tubes, and pulling equipment  Less Restrictive Alternative: Repositioning, Reorientation, Pain management, Re-evaluate equipment  Attending Physician Notified: MD ordered restraint,   Dr. Wilkerson  Order received: Yes    Family Notification: Other   Criteria explained to PATIENT  Patient's Response: No evidence of learning  Restraint care Plan initiated: YES     Sarai Silverman RN

## 2020-02-02 NOTE — PLAN OF CARE
"ICU End of Shift Summary.  For vital signs and complete assessments, please see documentation flowsheets.     Pertinent assessments: Oriented to self only. Tells staff \"fuck off\" \"I know my rights\". Pulling at lines. Tele SR/ST. Afebrile. Lungs clear. 2L oxymask. Incont of urine.   Major Shift Events: Admitted. Aggressive. Verbally abusive to staff  Plan (Upcoming Events): continue current plan of care  Discharge/Transfer Needs: Continue current plan of care    Bedside Shift Report Completed : Y  Bedside Safety Check Completed: Y  "

## 2020-02-02 NOTE — H&P
Admitted:     02/01/2020      CHIEF COMPLAINT:  Aggressive behavior, alcohol intoxication.      HISTORY OF PRESENT ILLNESS:  This is a 74-year-old gentleman who was brought in for with above complaints via EMS.  The patient does not divulge much history to me.  Most of the history is obtained from the ER records.  He is a 74-year-old gentleman with a history of hypertension, diabetes, who apparently was at [facility], where he had some yue or such to drink with his pals, when apparently he became ornery with some aggressive behavior, and subsequently EMS was summoned, and he was brought into the ER for further evaluation.  In the ER over here, he was seen by Dr. Luna.  Apparently, he was aggressive and noncooperative.  He was put in restraints, and I am asked to admit him.  I did call his wife briefly.  She states that there has been no confusion, but that his behavior was not out of the norm following the drinking.  She is unable to tell me what exactly he drank; however, the patient states that he drank some yue.  I discussed his care with Dr. Luna over here in the ER, and I am asked to admit him for further evaluation.      PAST MEDICAL HISTORY:  Significant for diabetes and hypertension.      PAST SURGICAL HISTORY:  Significant for vasectomy, lower back surgery.      FAMILY HISTORY:  Unable to be obtained, as the patient is noncooperative.      SOCIAL HISTORY:  Per his wife, he does not drink alcohol regularly.  He does not smoke.      HOME MEDICATIONS:  Include aspirin, glyburide, lisinopril, metformin.      REVIEW OF SYSTEMS:  As mentioned in the HPI.  He denies any chest pain or shortness of breath.  He denies any abdominal pain.  All other systems are reviewed and deemed unremarkable and negative.      PHYSICAL EXAMINATION:   VITAL SIGNS:  His temperature is 99.9, pulse is 113.  Blood pressure is 167/92, respiratory rate 30, O2 saturation is 94% on 2 liters.   GENERAL:  The patient is alert, awake,  oriented to self; however, he is unable to tell me how old.  He thinks we are in March, but is correct on the year.  He is able to correctly tell me that he is  and he resides in [place].   HEENT:  His pupils are equal, round, reactive to light.  Pharynx:  He has dry mucous membranes.   LUNGS:  Clear to auscultation anteriorly bilaterally.   HEART:  Tachycardic, S1, S2 normal.  No murmurs or gallops.   ABDOMEN:  Soft, nontender, nondistended, with hypoactive bowel sounds.   EXTREMITIES:  There is no edema.   SKIN:  There is no rash.   NEUROLOGIC:  His extraocular movements are intact.  He moves all his extremities, though he is in restraints.      LABORATORY:  Lab work obtained here shows the following:  A CMP was obtained which is grossly unremarkable other than potassium of 5.0, bicarbonate of 16, creatinine 1.73, total protein 6.7.  His LFTs are within normal limits.  His initial troponin is 0.058.  His lactic acid was 5.5.  His glucose level is 194.  His venous blood gas showed a pH of 7.30 with a pCO2 of 34.  On his CBC, his white cell count is 13.4, hemoglobin 12.6, hematocrit 38.7, platelet count of 256,000.  Absolute neutrophil count of 11.7.  His ethanol level is 0.10.  A 1-view chest x-ray was read as negative.  EKG obtained here in the ER shows sinus tachycardia at 118 beats per minute with occasional PAC.  Subsequent EKG shows sinus tachycardia with PACs at 112 beats per minute, nonspecific ST-T wave changes.      ASSESSMENT AND PLAN:   1.  Acute alcohol intoxication with resultant unruly behavior.  We will admit him under observation status.  We will give him a break off restraints to evaluate if he stays calm and cooperative if not, resume restraints.  He has been given multiple doses of Ativan here in the Emergency Room.  We will monitor him.  However, at this point in time, I do not think he is having any withdrawal symptoms.  This likely is a result of his intoxication. Will get a sitter  and continue hold that was placed in ED.  2.  Acute on chronic renal failure.  His prior creatinine in 2013 was read as 1.26 and is presently 1.73.  We will hydrate him with IV fluids.   3.  Lactic acidosis, likely due to alcohol intoxication and possibly metformin.  We will monitor.   4.  Elevated troponin, likely due to demand ischemia.  We will monitor him on telemetry and get an echocardiogram.  We will trend his troponins.      CODE STATUS:  Full code.      I will admit him under observation status.         YAMILETH HANSON MD             D: 2020   T: 2020   MT: VERNA      Name:     MOHIT ELIZALDE   MRN:      [id number]        Account:      [id number]   :      1945        Admitted:     2020                   Document: [id number]       cc: [facility]

## 2020-02-02 NOTE — PLAN OF CARE
Right wrist, Left wrist, and soft restraints restraints discontinued at 11:49 AM on 2/2/2020.    Restraint discontinue criteria met, patient is calm, cooperative and safe. Restraints removed.     Patient's Response: No evidence of learning  Family Notification: Other  Attending Physician Notified: MD ordered restraint,      Rosemarie Pritchett RN

## 2020-02-02 NOTE — PHARMACY-ADMISSION MEDICATION HISTORY
Admission medication history interview status for this patient is complete. See [facility] admission navigator for allergy information, prior to admission medications and immunization status.     Medication history interview source(s):Patient  Medication history resources (including written lists, pill bottles, clinic record):None  Primary pharmacy: [facility]     Changes made to PTA medication list:  Added: none  Deleted: vit D, hydrochlorothiazide   Changed:  Added directions/dose to all medications    Actions taken by pharmacist (provider contacted, etc):None     Additional medication history information: Patient states he only takes 3 medications and did not want any of them ordered at this time. Patient slightly altered from medications administered here and from alcohol ingestion prior to arrival. Med history collected to best of my ability and doses verified through care everwhere.     Medication reconciliation/reorder completed by provider prior to medication history?  No     Do you take OTC medications (eg tylenol, ibuprofen, fish oil, eye/ear drops, etc)? Yes     For patients on insulin therapy: No    Prior to Admission medications    Medication Sig Last Dose Taking? Auth Provider   ASPIRIN ADULT LOW STRENGTH PO Take 81 mg by mouth daily  Past Week at Unknown time Yes Reported, Patient   GLYBURIDE PO Take 5 mg by mouth daily (with breakfast)  2/1/2020 at Unknown time Yes Reported, Patient   lisinopril (PRINIVIL/ZESTRIL) 20 MG tablet Take 20 mg by mouth daily  2/1/2020 at am Yes Reported, Patient   metFORMIN (GLUCOPHAGE) 1000 MG tablet Take 1,000 mg by mouth 2 times daily (with meals)  2/1/2020 at am Yes Reported, Patient

## 2020-02-02 NOTE — ED NOTES
"RN went into patients room to talk to him, patient yelling at RN stating \"there is no fucking reason that I need to be tied down to this bed or gotten shots in my legs, you people seem to think that you have control over me because I am intoxicated. Call the police to bring me home.\" RN explained to patient that staff tried to get him to stop yelling and swearing at staff for 20 minutes and it was unsuccessful. Patient continued swearing at RN after explanation. MD amaral notified.   "

## 2020-02-03 LAB
ANION GAP SERPL CALCULATED.3IONS-SCNC: 5 MMOL/L (ref 3–14)
BASOPHILS # BLD AUTO: 0 10E9/L (ref 0–0.2)
BASOPHILS NFR BLD AUTO: 0.3 %
BUN SERPL-MCNC: 18 MG/DL (ref 7–30)
CALCIUM SERPL-MCNC: 8.4 MG/DL (ref 8.5–10.1)
CHLORIDE SERPL-SCNC: 113 MMOL/L (ref 94–109)
CO2 SERPL-SCNC: 24 MMOL/L (ref 20–32)
CREAT SERPL-MCNC: 1.25 MG/DL (ref 0.66–1.25)
DIFFERENTIAL METHOD BLD: ABNORMAL
EOSINOPHIL # BLD AUTO: 0.1 10E9/L (ref 0–0.7)
EOSINOPHIL NFR BLD AUTO: 0.7 %
ERYTHROCYTE [DISTWIDTH] IN BLOOD BY AUTOMATED COUNT: 12.4 % (ref 10–15)
GFR SERPL CREATININE-BSD FRML MDRD: 55 ML/MIN/{1.73_M2}
GLUCOSE BLDC GLUCOMTR-MCNC: 146 MG/DL (ref 70–99)
GLUCOSE BLDC GLUCOMTR-MCNC: 172 MG/DL (ref 70–99)
GLUCOSE BLDC GLUCOMTR-MCNC: 178 MG/DL (ref 70–99)
GLUCOSE BLDC GLUCOMTR-MCNC: 193 MG/DL (ref 70–99)
GLUCOSE BLDC GLUCOMTR-MCNC: 243 MG/DL (ref 70–99)
GLUCOSE SERPL-MCNC: 184 MG/DL (ref 70–99)
HCT VFR BLD AUTO: 36.7 % (ref 40–53)
HGB BLD-MCNC: 12.1 G/DL (ref 13.3–17.7)
IMM GRANULOCYTES # BLD: 0.1 10E9/L (ref 0–0.4)
IMM GRANULOCYTES NFR BLD: 0.6 %
INTERPRETATION ECG - MUSE: NORMAL
INTERPRETATION ECG - MUSE: NORMAL
LYMPHOCYTES # BLD AUTO: 1.5 10E9/L (ref 0.8–5.3)
LYMPHOCYTES NFR BLD AUTO: 12.3 %
MCH RBC QN AUTO: 30.6 PG (ref 26.5–33)
MCHC RBC AUTO-ENTMCNC: 33 G/DL (ref 31.5–36.5)
MCV RBC AUTO: 93 FL (ref 78–100)
MONOCYTES # BLD AUTO: 1 10E9/L (ref 0–1.3)
MONOCYTES NFR BLD AUTO: 8.3 %
NEUTROPHILS # BLD AUTO: 9.3 10E9/L (ref 1.6–8.3)
NEUTROPHILS NFR BLD AUTO: 77.8 %
NRBC # BLD AUTO: 0 10*3/UL
NRBC BLD AUTO-RTO: 0 /100
PLATELET # BLD AUTO: 226 10E9/L (ref 150–450)
POTASSIUM SERPL-SCNC: 4.5 MMOL/L (ref 3.4–5.3)
RBC # BLD AUTO: 3.96 10E12/L (ref 4.4–5.9)
SODIUM SERPL-SCNC: 142 MMOL/L (ref 133–144)
TROPONIN I SERPL-MCNC: 1.06 UG/L (ref 0–0.04)
WBC # BLD AUTO: 12 10E9/L (ref 4–11)

## 2020-02-03 PROCEDURE — 00000146 ZZHCL STATISTIC GLUCOSE BY METER IP

## 2020-02-03 PROCEDURE — 80048 BASIC METABOLIC PNL TOTAL CA: CPT | Performed by: INTERNAL MEDICINE

## 2020-02-03 PROCEDURE — 84484 ASSAY OF TROPONIN QUANT: CPT | Performed by: INTERNAL MEDICINE

## 2020-02-03 PROCEDURE — 85025 COMPLETE CBC W/AUTO DIFF WBC: CPT | Performed by: INTERNAL MEDICINE

## 2020-02-03 PROCEDURE — 25000125 ZZHC RX 250: Performed by: INTERNAL MEDICINE

## 2020-02-03 PROCEDURE — G0378 HOSPITAL OBSERVATION PER HR: HCPCS

## 2020-02-03 PROCEDURE — 12000000 ZZH R&B MED SURG/OB

## 2020-02-03 PROCEDURE — 25000132 ZZH RX MED GY IP 250 OP 250 PS 637: Mod: GY | Performed by: INTERNAL MEDICINE

## 2020-02-03 PROCEDURE — 99233 SBSQ HOSP IP/OBS HIGH 50: CPT | Performed by: INTERNAL MEDICINE

## 2020-02-03 PROCEDURE — 25000132 ZZH RX MED GY IP 250 OP 250 PS 637: Mod: GY | Performed by: PSYCHIATRY & NEUROLOGY

## 2020-02-03 PROCEDURE — 36415 COLL VENOUS BLD VENIPUNCTURE: CPT | Performed by: INTERNAL MEDICINE

## 2020-02-03 PROCEDURE — 25000128 H RX IP 250 OP 636: Performed by: INTERNAL MEDICINE

## 2020-02-03 PROCEDURE — 25800030 ZZH RX IP 258 OP 636: Performed by: INTERNAL MEDICINE

## 2020-02-03 RX ORDER — CARBIDOPA AND LEVODOPA 25; 100 MG/1; MG/1
1 TABLET ORAL 3 TIMES DAILY
Status: DISCONTINUED | OUTPATIENT
Start: 2020-02-03 | End: 2020-02-04 | Stop reason: HOSPADM

## 2020-02-03 RX ORDER — LISINOPRIL 20 MG/1
20 TABLET ORAL DAILY
Status: DISCONTINUED | OUTPATIENT
Start: 2020-02-03 | End: 2020-02-04 | Stop reason: HOSPADM

## 2020-02-03 RX ADMIN — METFORMIN HYDROCHLORIDE 1000 MG: 500 TABLET ORAL at 13:14

## 2020-02-03 RX ADMIN — LISINOPRIL 20 MG: 20 TABLET ORAL at 13:14

## 2020-02-03 RX ADMIN — FOLIC ACID: 5 INJECTION, SOLUTION INTRAMUSCULAR; INTRAVENOUS; SUBCUTANEOUS at 01:50

## 2020-02-03 RX ADMIN — CARBIDOPA AND LEVODOPA 1 TABLET: 25; 100 TABLET ORAL at 22:41

## 2020-02-03 RX ADMIN — METOPROLOL TARTRATE 2.5 MG: 5 INJECTION INTRAVENOUS at 06:48

## 2020-02-03 RX ADMIN — CARBIDOPA AND LEVODOPA 1 TABLET: 25; 100 TABLET ORAL at 18:51

## 2020-02-03 RX ADMIN — METFORMIN HYDROCHLORIDE 1000 MG: 500 TABLET ORAL at 17:43

## 2020-02-03 RX ADMIN — METOPROLOL TARTRATE 2.5 MG: 5 INJECTION INTRAVENOUS at 00:26

## 2020-02-03 ASSESSMENT — ACTIVITIES OF DAILY LIVING (ADL)
ADLS_ACUITY_SCORE: 22
ADLS_ACUITY_SCORE: 20
ADLS_ACUITY_SCORE: 22

## 2020-02-03 NOTE — PLAN OF CARE
PRIMARY DIAGNOSIS: ETOH  OUTPATIENT/OBSERVATION GOALS TO BE MET BEFORE DISCHARGE:  1. ADLs back to baseline: No    2. Activity and level of assistance: not up yet    3. Pain status: Pain free.    4. Return to near baseline physical activity: No     Discharge Planner Nurse   Safe discharge environment identified: No  Barriers to discharge: Yes       Entered by: Nely Rdz 02/02/2020 11:14 PM     Please review provider order for any additional goals.   Nurse to notify provider when observation goals have been met and patient is ready for discharge.

## 2020-02-03 NOTE — PLAN OF CARE
PRIMARY DIAGNOSIS: ETOH   OUTPATIENT/OBSERVATION GOALS TO BE MET BEFORE DISCHARGE:  1. ADLs back to baseline: No    2. Activity and level of assistance: lift    3. Pain status: Pain free.    4. Return to near baseline physical activity: No     Discharge Planner Nurse   Safe discharge environment identified: No  Barriers to discharge: Yes       Entered by: Nely Rdz 02/03/2020 4:38 AM     Please review provider order for any additional goals.   Nurse to notify provider when observation goals have been met and patient is ready for discharge.    VS: high bp's 175/80- scheduled metoprolol given, max temp 100.8, CIWA scores 11, 6, 6  Orientation: oriented to self only, paranoid, refusing some treatments, sitter in room  Glucose checks: refused at bedtime, 172 overnight  Activity: moving independently in bed  Diet: mod carb, have not been taking in PO  GI: WDL  : incontinent  Respiratory: clear lungs, 2LNC  IV: NS @100, banana bag every 24 hours  Plan: Possibility to have inpatient stress test vs outpatient stress test

## 2020-02-03 NOTE — PROGRESS NOTES
[facility]  Hospitalist Progress Note  Elle Palomo MD 02/03/2020    Reason for Stay (Diagnosis): Belligerent and abusive behavior in the setting of alcohol intoxication         Assessment and Plan:      Summary of Stay: Johny Renee is a 74 year old male with a history of hypertension, type 2 diabetes, found on the ground outside of [facility] bar and restaurant.  Apparently the police called and he became belligerent and aggressive prompting EMS activation ultimately bring him in our emergency room.  In our emergency room he was aggressive and noncooperative, he was put in restraints, he was given multiple agents, him down including lorazepam and olanzapine, placed on a hold and admitted to the ICU on observation for close monitoring on 2/1/2020.    He has been intermittently belligerent and aggressive prompting more lorazepam, but has been calm and mostly sleepy over the course of today.  Admission labs were notable for a moderately elevated alcohol level of 0.10 (although not clear when his last drink was).    The wife is also alcoholic and didn't remember how much he drank. He has previously had a relatively heavy drinking problem head had a DUI several years ago. He does have an angry and belligerent nature in particular when it comes to the police.  He had an altercation with them in December 2019 also prompting an ER visit at which time he was released to family.    Problem List:   1. Acute alcohol intoxication: Resolved. He is intermittently agitated but not in withdrawal.    2. Alcohol abuse: Will consult CD for counseling and recommendations.    3. Intermittent agitation, behavioral issues: He is more cooperative today. He is not on hold. He stated that he has anger outburst at times. Currently appears calm. Will consult psych for ecvaluation for behavioral issues.     4. Elevated troponin: Troponin was 0.058 and subsequently aria to 1.267. Troponin now down to 1.056.  Echocardiogram  is without regional wall motion abnormalities.  This is in the setting of renal dysfunction with a BUN and creatinine of 30 and 1.73 on admission. EKGs without any acute changes.  Echocardiogram showed normal ejection fraction with borderline anteroapical hypokinesis. Consider stress testing while in hospital if he leaves I would recommend getting a stress test in the outpatient setting.  Continue daily aspirin    5. Elevated lactic acid: Likely due to metformin, acute kidney injury and adequate tissue perfusion-resolved after IV fluids    6. DOMITILA: Admitting BUN/creatinine of 30 and 1.73  -Creatinine down to 1.25 today with IV fluids. Will monitor renal function.     7. Hypertension: PTA regimen with lisinopril 20 mg daily. Kidney function improved.  Will resume lisinopril. Will monitor BP.       8. Type 2 diabetes: On a combination of glyburide 5 mg daily and metformin 1 g p.o. twice daily.  Glyburide on hold due to minimal p.o. intake. Will resume metformin as lactic acidosis and DOMITILA improved. Hemoglobin A1c 7.1% which suggest reasonable home control    9. 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 Shuffling gait: He is apparently been evaluated by a neurologist and does not have findings suggestive of Parkinson's disease. He stated that he has been having shuffling gait for about two years and his symptoms are getting worse. He stated that his father had Parkinson's. Will consult neurology for evaluation and consideration of treatment for Parkinson's.     DVT Prophylaxis: Holding on any anticoagulation and PCD's mainly because they might agitate him.  I like him to wake up and move around if possible  Code Status: Full Code  Functional Status: Typically independent, shuffles his gait  Diet: Moderate CHO diet   Read: Not in place  Access: Single peripheral    Disposition Plan   Expected discharge in 1-2 days to prior  living arrangement once more alert and cooperative. Psych and neurology consulted. Also consulted physical therapy for discharge recommendations.        Entered: Elle Palomo MD 02/03/2020, 11:33 AM           Interval History (Subjective):      Patient seen and examined today. He stated that he is feeling better. He claims that he has intermittent agitation and also complains of anger outburst at times. He has history of alcohol abuse                  Physical Exam:      Last Vital Signs:  BP (!) 156/69 (BP Location: Right arm)   Pulse 87   Temp 97.5  F (36.4  C) (Axillary)   Resp 28   Wt 97.8 kg (215 lb 9.8 oz)   SpO2 96%   BMI 30.94 kg/m      I/O: Inaccurate  Telemetry: NSR in the 90s  Weights: Not being tracked    In bed, sleeping comfortably.  Wakes up to voice and touch but only briefly.  Slurring his speech thinks he is at home in his bed.  Denies any pain heart is regular without murmurs rubs or gallops I do not appreciate any lower extremity edema his belly is soft and nontender lungs are clear to auscultation he is moving around in bed on his own           Medications:      All current medications were reviewed with changes reflected in problem list.         Data:      All new lab and imaging data was reviewed.   Labs:  Recent Labs   Lab 02/03/20  0721      POTASSIUM 4.5   CHLORIDE 113*   CO2 24   ANIONGAP 5   *   BUN 18   CR 1.25   GFRESTIMATED 55*   GFRESTBLACK 64   TORY 8.4*     Recent Labs   Lab 02/03/20  0721   WBC 12.0*   HGB 12.1*   HCT 36.7*   MCV 93      Troponin 0 0.058-> 1.110-> 1.267  Imaging:  Echocardiogram 2/2/2020  Interpretation Summary     The left ventricle is normal in size.  The visual ejection fraction is estimated at 60-65%.  No gross regional wall motion abnormalities except for a small amount of  borderline distal anteroapical hypokinesis.  No significant valvular heart  disease.  _____________________________________________________________________________

## 2020-02-03 NOTE — PLAN OF CARE
PRIMARY DIAGNOSIS: ETOH  OUTPATIENT/OBSERVATION GOALS TO BE MET BEFORE DISCHARGE:  1. ADLs back to baseline: No    2. Activity and level of assistance: lift    3. Pain status: Pain free.    4. Return to near baseline physical activity: No     Discharge Planner Nurse   Safe discharge environment identified: No  Barriers to discharge: Yes       Entered by: Nely Rdz 02/03/2020 2:52 AM     Please review provider order for any additional goals.   Nurse to notify provider when observation goals have been met and patient is ready for discharge.

## 2020-02-03 NOTE — CONSULTS
"Neurology Consult Note  [facility]       [February 3, 2020]                                                                                       Admission Date: 2020  Hospital Day: 3  Code Status: Full Code    Patient: Johny Renee      : 1945  MRN:  [id number]     CC:      Chief Complaint   Patient presents with     Alcohol Intoxication       Consult Request:  Referring Provider:  Elle Palomo MD    Indication for Consultation: Neurology IP Consult: General (non-stroke/non-ICU); Patient to be seen: Routine - within 24 hours; shuffling gait. family history of parkinsons.  [CON9] (Order [id number])     Primary Care Provider:  Didier JAIME        HPI:  Johny Renee is a 74 year old yo RH male admitted for a behavorial outburst while intoxicated with ethanol. Testing found elevated troponins. I have been asked to evaluate him for Parkinson disease.     He was previously referred by his primary care physician (Dr. Velásquez) to my partner, Dr. Alan Mcarthur, to evaluate for Parkinson disease, but he cancelled his appointment (5/15/2019) stating that he did not wish to complete his new patient intake form, or pay for the appointment. When I introduced myself to him he did not wish to have the evaluation today, as he was concerned that he would have \"Parkinson disease\" on his record, and this was not acceptable to him. Eventually he agreed to permit me to examine him. He reports that his father was diagnosed with Parkinson disease and had notable tremor and slow gait, eventually ending up in a wheelchair. He was prescribed Sinemet, but the patient and his wife report that the medication did not seem to notably improve his symptoms. Interestingly, the patient feels that he has Parkinson disease, and apprently requested Sinemet from his pharmacist, who declined to provide the medication with a script. The patient has been noted to have a slow, shuffling gait for more " than a year, but no tremor.    A complete review of symptoms was performed including vascular, infectious, cardiovascular, pulmonary, gastrointestinal, endocrinological, hematologic, dermatologic, musculoskeletal, and neurological. All were normal except as above.    CURRENT PROBLEM LIST:  Patient Active Problem List    Diagnosis Date Noted     Alcohol intoxication (H) 02/02/2020     Priority: Medium       PAST MEDICAL HISTORY:  ALLERGIES: No Known Allergies  Tobacco:    History   Smoking Status     Former Smoker     Packs/day: 1.00     Years: 15.00     Types: Cigarettes   Smokeless Tobacco     Not on file     Alcohol:  Social History    Substance and Sexual Activity      Alcohol use: Yes        Comment: 1 drink daily    MEDICATIONS:       CURRENTLY SCHEDULED MEDICATIONS     aspirin  300 mg Rectal Daily     insulin aspart  1-7 Units Subcutaneous TID AC     insulin aspart  1-5 Units Subcutaneous At Bedtime     lisinopril  20 mg Oral Daily     metFORMIN  1,000 mg Oral BID w/meals     metoprolol  2.5 mg Intravenous Q6H     IV Fluid with vitamins   Intravenous Q24H     [START ON 2/7/2020] thiamine  100 mg Intravenous Daily          HOME MEDICATIONS  Medications Prior to Admission   Medication Sig Dispense Refill Last Dose     ASPIRIN ADULT LOW STRENGTH PO Take 81 mg by mouth daily    Past Week at Unknown time     GLYBURIDE PO Take 5 mg by mouth daily (with breakfast)    2/1/2020 at Unknown time     lisinopril (PRINIVIL/ZESTRIL) 20 MG tablet Take 20 mg by mouth daily    2/1/2020 at am     metFORMIN (GLUCOPHAGE) 1000 MG tablet Take 1,000 mg by mouth 2 times daily (with meals)    2/1/2020 at am     MEDICAL HISTORY  Past Medical History:   Diagnosis Date     Diabetes mellitus (H)      Hypertension      SURGICAL HISTORY  Past Surgical History:   Procedure Laterality Date     BACK SURGERY      disc surgery x2     ENT SURGERY      nasal polyp removal several times     GENITOURINARY SURGERY      vasectomy     FAMILY HISTORY   "  No family history on file.  SOCIAL HISTORY  Social History     Socioeconomic History     Marital status:      Spouse name: Not on file     Number of children: Not on file     Years of education: Not on file     Highest education level: Not on file   Occupational History     Not on file   Social Needs     Financial resource strain: Not on file     Food insecurity:     Worry: Not on file     Inability: Not on file     Transportation needs:     Medical: Not on file     Non-medical: Not on file   Tobacco Use     Smoking status: Former Smoker     Packs/day: 1.00     Years: 15.00     Pack years: 15.00     Types: Cigarettes   Substance and Sexual Activity     Alcohol use: Yes     Comment: 1 drink daily     Drug use: No     Sexual activity: Not on file   Lifestyle     Physical activity:     Days per week: Not on file     Minutes per session: Not on file     Stress: Not on file   Relationships     Social connections:     Talks on phone: Not on file     Gets together: Not on file     Attends Anabaptism service: Not on file     Active member of club or organization: Not on file     Attends meetings of clubs or organizations: Not on file     Relationship status: Not on file     Intimate partner violence:     Fear of current or ex partner: Not on file     Emotionally abused: Not on file     Physically abused: Not on file     Forced sexual activity: Not on file   Other Topics Concern     Parent/sibling w/ CABG, MI or angioplasty before 65F 55M? Not Asked   Social History Narrative     Not on file         GENERAL EXAMINATION:    He is an elderly, well-developed, well-nourished man, 5' 10\" and 215 lbs 9.76 oz. Blood pressure is 147/85. Peripheral pulses are intact at 87 and regular. He has no carotid bruits. Conjunctiva are normal. His oropharynx is without lesions or inflammation. Skin examination is unremarkable. He has no pretibial edema, rashes, or unusual lesions. Nail examination shows no clubbing, cyanosis, or " "deformities. Respiratory examination is unremarkable, with rate of 28, unlabored. He is afebrile.               Temp: 97.5  F (36.4  C)   Weight: 97.8 kg (215 lb 9.8 oz)    Temp src: Axillary         BP: (!) 147/85   Heart Rate: 85     Estimated body mass index is 30.94 kg/m  as calculated from the following:    Height as of 19: 1.778 m (5' 10\").    Weight as of this encounter: 97.8 kg (215 lb 9.8 oz).    Resp: 28   SpO2: 96 %   O2 Device: Nasal cannula     Blood Pressure:   BP Readings from Last 3 Encounters:   20 (!) 147/85   19 (!) 152/101   05/10/12 143/83       T24 : Temp (24hrs), Av.8  F (37.1  C), Min:97.5  F (36.4  C), Max:100.8  F (38.2  C)     NEUROLOGICAL EXAMINATION  Mental Status:  He is awake, alert, and interactive. He is  Mildly con fused, and blames it on the sedating medication he has been given earlier today. His speech is spontaneous and fluent. He has no aphasia or dysarthria. He tends to be suspicious of the motivation of others.    Station and Gait: He stands with assistance. He has a shuffling gait, drags his right leg/foot. He has mild retropulsion, and holds on to the walker in the hospital (he uses no assistive device at home). He turns en bloc. He has moderate gait instability.    Skull and Spine: His head is atraumatic. His C-spine is non-tender to palpation.     Cranial Nerves: Visual fields are full. Extraocular movements are intact. Pursuits are smooth and saccades are of normal speed. He has no nystagmus in the horizontal or vertical planes. His pupils are reactive. His face is symmetric at rest and with movement. He has no ptosis. He has reduced blink frequency. His tongue is midline. Shoulder shrug is symmetric.  Hearing is intact.    Motor: Muscle bulk is preserved. He has rigidity in his arms, somewhat more pronounced on the left, and in both legs, worse on the right.  strength is intact. He has no tremor at rest or with arms outstretched. He has " difficulty extending his arms, and can't straighten his elbows. He reports right more than left shoulder pain limiting his arm extension. He has cogwheeling in both arms, increased when he rapidly taps his thigh with his contralateral hand.     Sensory: Sensation is symmetric in his arms and legs.     Coordination: He has no resting, postural, or action tremor.       .  LABORATORY RESULTS      SMA-7:  Recent Labs   Lab Test 02/03/20  0721 02/02/20  0559 02/02/20  0201 02/01/20 1927    144  --  142   POTASSIUM 4.5 4.4 4.4 5.0   CHLORIDE 113* 115*  --  114*   CO2 24 22  --  16*   * 190*  --  194*   BUN 18 25  --  30   CR 1.25 1.38*  --  1.73*   TORY 8.4* 8.6  --  8.8     No results for input(s): MAG in the last 06771 hours.  No results for input(s): PHOS in the last 95148 hours.0.98 mg (actual weight)  CMP:  Recent Labs   Lab 02/02/20  0559 02/01/20 1927   PROTTOTAL 6.2* 6.7*   ALBUMIN 3.3* 3.6   ALKPHOS 48 47   AST 43 18   ALT 30 30   BILITOTAL 0.5 0.2     CBC:    Recent Labs   Lab 02/03/20  0721 02/02/20  0559 02/01/20 1927   WBC 12.0* 12.5* 13.4*   RBC 3.96* 4.07* 4.05*   HGB 12.1* 12.7* 12.6*   HCT 36.7* 39.2* 38.7*   MCV 93 96 96    219 256     HgA1c:   Hemoglobin A1C   Date Value Ref Range Status   02/02/2020 7.1 (H) 0 - 5.6 % Final     Comment:     Normal <5.7% Prediabetes 5.7-6.4%  Diabetes 6.5% or higher - adopted from ADA   consensus guidelines.       Recent Labs   Lab 02/03/20 0721   TROPI 1.056*     Lab Results   Component Value Date    TROPI 1.056 () 02/03/2020    TROPI 1.267 () 02/02/2020    TROPI 1.110 () 02/02/2020        COAGULATION PANEL  --MTHFR:     Lab Results   Component Value Date    PATH  05/10/2012     Patient Name: MOHIT ELIZALDE  MR#: [id number]  Specimen #: [id number]  Collected: 5/10/2012  Received: 5/10/2012  Reported: 5/11/2012 16:56  Ordering Phy(s): JENNIFFER CHRISTY              SPECIMEN(S):  A: Rectal polyp at 5cm  B: Rectosigmoid polyp at 15cm  C: Proximal  "descending polyp at 50cm  D: Proximal ascending polyp  E: Mid ascending polyp  F: Distal transverse polyp    FINAL DIAGNOSIS:  A.  Rectum, polypectomy - Tubulovillous adenoma, no evidence of  malignancy.    B. Colon, rectosigmoid, polypectomy - Tubular adenoma, hyperplastic  polyp.    C.  Colon, at 50 cm polypectomy - Tubular adenoma.    D.  Colon, ascending, polypectomy - Fragments of villous adenoma, no  evidence of malignancy.    E.   Colon, mid ascending, polypectomy - Tubulovillous adenoma.    F.  Colon, distal transverse, polypectomy - Tubular adenoma,  nonneoplastic colonic mucosa.      COMMENT:    Specimens C and D consist of multiple fragments. As such, more severe  disease cannot be excluded in any residual lesion.      Electronically signed out by:    Feliciano Tay M.D.      CLINICAL HISTORY:  Family history of colon cancer.  Possible followup to colon polyps.      GROSS:  A.  The specimen is labeled \"rectal polyp at 5 cm\".  The specimen  consists of a red rubbery dome-shaped polyp (1.1 x 0.9 x 0.7 cm).  The  specimen is inked black, serially sectioned, and entirely submitted.    B.  The specimen is labeled \"rectosigmoid polyp x 2 at 15 cm\".  The  specimen consists of 2 pink-tan polyps ranging from 0.4 cm up to 0.6 x  0.4 x 0.2 cm.  The latter polyp is inked black.  The specimens are  entirely submitted.    C.  The specimen is labeled \"proximal descending colon polyp at 50 cm\".  The specimen consists of 3 pink tissue fragments ranging from 0.2 cm up  to 0.4 cm.  The specimens are entirely submitted.    D.  The specimen is labeled \"proximal ascending colon polyp x 2\".  The  specimen consists of 4 red polypoid fragments ranging from 0.3 cm up to  0.8 x 0.5 x 0.4 cm.  The largest polyp is inked black and bisected.  The  specimens are entirely submitted.    E.  The specimen is labeled \"mid ascending colon polyp\".  The specimen  consists of a red rubbery nodular polyp (0.8 x 0.7 x 0.5 cm).  The  specimen " "is inked black, bisected and entirely submitted.    F.  The specimen is labeled \"distal transverse polyp x 2\".  The specimen  consists of 2 pink polypoid structures ranging from 0.2 cm up to 0.4 cm.   The specimens are entirely submitted.  Si  TRS/tw    MICROSCOPIC:  A - F. A formal microscopic examination is performed.    BCT/gwen  2012      TESTING LAB LOCATION:  [address]  [id number] [phone]    COLLECTION SITE:  Client: [facility]  Location: [facility] (S)     ABG:   Recent Labs   Lab 20  2110   O2PER 2L NC     Blood Cultures: No results for input(s): CULT in the last 168 hours.    IMAGING RESULTS   Echocardiogram Complete    Result Date: 2020  [id number]^MARGIE^YAMILETH^IRIS    [facility] Echocardiography Laboratory [address] [place] [address]   Name: MOHIT ELIZALDE MRN: [id number] : 1945 Study Date: 2020 08:36 AM Age: 74 yrs Gender: Male Patient Location: [facility] Reason For Study: Abn EKG Ordering Physician: YAMILETH HANSON Referring Physician: Joy Maya Performed By: Yadira Hu RDCS  BSA: 2.1 m2 Height: 70 in Weight: 210 lb HR: 94 BP: 147/84 mmHg _____________________________________________________________________________ __   Procedure Complete Portable Echo Adult. Optison (NDC #[id number]) given intravenously. _____________________________________________________________________________ __   Interpretation Summary  The left ventricle is normal in size. The visual ejection fraction is estimated at 60-65%. No gross regional wall motion abnormalities except for a small amount of borderline distal anteroapical hypokinesis. No significant valvular heart disease. _____________________________________________________________________________ __   Left Ventricle The left ventricle is normal in size. There is normal left ventricular wall thickness. The " visual ejection fraction is estimated at 60-65%. Diastolic Doppler findings (E/E' ratio and/or other parameters) suggest left ventricular filling pressures are increased. No gross regional wall motion abnormalities except for a small amount of borderline distal anteroapical hypokinesis.  Right Ventricle The right ventricle is normal in size and function.  Atria Normal left atrial size. Right atrial size is normal. There is no color Doppler evidence of an atrial shunt.  Mitral Valve The mitral valve leaflets are mildly thickened. There is mild (1+) mitral regurgitation.   Tricuspid Valve There is trace tricuspid regurgitation. Right ventricular systolic pressure could not be approximated due to inadequate tricuspid regurgitation.  Aortic Valve There is trivial trileaflet aortic sclerosis. No aortic regurgitation is present.  Pulmonic Valve There is trace pulmonic valvular regurgitation.  Vessels Normal size aorta. The aortic root is normal size.  Pericardium There is no pericardial effusion.   Rhythm Sinus rhythm was noted. _____________________________________________________________________________ __ MMode/2D Measurements & Calculations IVSd: 1.1 cm  LVIDd: 4.9 cm LVIDs: 3.1 cm LVPWd: 1.0 cm FS: 37.4 % LV mass(C)d: 197.1 grams LV mass(C)dI: 92.5 grams/m2 Ao root diam: 3.6 cm LA dimension: 3.6 cm asc Aorta Diam: 3.4 cm LA/Ao: 0.98 LVOT diam: 2.0 cm LVOT area: 3.1 cm2 LA Volume (BP): 54.0 ml LA Volume Index (BP): 25.4 ml/m2 RWT: 0.42    Doppler Measurements & Calculations MV E max inessa: 92.8 cm/sec MV A max inessa: 72.6 cm/sec MV E/A: 1.3 MV dec time: 0.14 sec Ao V2 max: 144.2 cm/sec Ao max P.0 mmHg E/E' av.2 Lateral E/e': 8.4 Medial E/e': 16.1    _____________________________________________________________________________ __    Report approved by: Ivan Dawn 2020 10:25 AM      Xr Chest Port 1 View    Result Date: 2020  EXAM: XR CHEST PORT 1 VW LOCATION: [facility] DATE/TIME:  2/1/2020 8:38 PM INDICATION: Chest pain COMPARISON: None.     IMPRESSION: Negative chest.  ______________________________________________    EKG:        ASSESSMENT     1. Shuffling gait, dragging his right leg, with rigidity in his arms and legs, without tremor. 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 Despite his lack of tremor, he has history and findings consistent with probable parkinson disease.   2. Gait instability. He may have cervical myelopathy contributing, but this can be pursued as an outpatient.    RECOMMENDATIONS   1. I recommend a diagnostic/threapeuric trial of Sinemet 25/100, to be given before dinner. Ideally, the medication should be given an hour before meals, or two hours after meals. If he can take the medication tonight, I recommend he stand and walk (with assistance/supervision) ~an hour after taking the medication, to see if his movements improve, with less rigidity and more fluidity to movement. He should continue 25/100 tid, one hour prior to meals.  2. I recommend follow-up in clinic for further evaluation and treatment recommendations. He can call [phone] to see up an appointment.        Nader Traore M.D., Ph.D.    [facility]

## 2020-02-03 NOTE — PLAN OF CARE
ICU End of Shift Summary.  For vital signs and complete assessments, please see documentation flowsheets.     Pertinent assessments: Lethargic. Oriented to self only. Denies pain. Tele SR. Son called and updated.  Major Shift Events: transfer to [facility]  Plan (Upcoming Events): Home when awake  Discharge/Transfer Needs: Home    Bedside Shift Report Completed : Y  Bedside Safety Check Completed: Y

## 2020-02-04 ENCOUNTER — APPOINTMENT (OUTPATIENT)
Dept: PHYSICAL THERAPY | Facility: CLINIC | Age: 75
DRG: 897 | End: 2020-02-04
Attending: INTERNAL MEDICINE
Payer: MEDICARE

## 2020-02-04 VITALS
RESPIRATION RATE: 18 BRPM | OXYGEN SATURATION: 94 % | SYSTOLIC BLOOD PRESSURE: 132 MMHG | WEIGHT: 215.61 LBS | HEART RATE: 87 BPM | TEMPERATURE: 97.7 F | DIASTOLIC BLOOD PRESSURE: 66 MMHG | BODY MASS INDEX: 30.94 KG/M2

## 2020-02-04 LAB
ANION GAP SERPL CALCULATED.3IONS-SCNC: 5 MMOL/L (ref 3–14)
BASOPHILS # BLD AUTO: 0.1 10E9/L (ref 0–0.2)
BASOPHILS NFR BLD AUTO: 0.5 %
BUN SERPL-MCNC: 20 MG/DL (ref 7–30)
CALCIUM SERPL-MCNC: 8.5 MG/DL (ref 8.5–10.1)
CHLORIDE SERPL-SCNC: 114 MMOL/L (ref 94–109)
CO2 SERPL-SCNC: 23 MMOL/L (ref 20–32)
CREAT SERPL-MCNC: 1.32 MG/DL (ref 0.66–1.25)
DEPRECATED S PYO AG THROAT QL EIA: ABNORMAL
DIFFERENTIAL METHOD BLD: ABNORMAL
EOSINOPHIL # BLD AUTO: 0.6 10E9/L (ref 0–0.7)
EOSINOPHIL NFR BLD AUTO: 6.2 %
ERYTHROCYTE [DISTWIDTH] IN BLOOD BY AUTOMATED COUNT: 12.4 % (ref 10–15)
GFR SERPL CREATININE-BSD FRML MDRD: 52 ML/MIN/{1.73_M2}
GLUCOSE BLDC GLUCOMTR-MCNC: 151 MG/DL (ref 70–99)
GLUCOSE BLDC GLUCOMTR-MCNC: 155 MG/DL (ref 70–99)
GLUCOSE BLDC GLUCOMTR-MCNC: 234 MG/DL (ref 70–99)
GLUCOSE SERPL-MCNC: 162 MG/DL (ref 70–99)
HCT VFR BLD AUTO: 37.1 % (ref 40–53)
HGB BLD-MCNC: 12.2 G/DL (ref 13.3–17.7)
IMM GRANULOCYTES # BLD: 0 10E9/L (ref 0–0.4)
IMM GRANULOCYTES NFR BLD: 0.4 %
LYMPHOCYTES # BLD AUTO: 1.5 10E9/L (ref 0.8–5.3)
LYMPHOCYTES NFR BLD AUTO: 15.6 %
MCH RBC QN AUTO: 31.3 PG (ref 26.5–33)
MCHC RBC AUTO-ENTMCNC: 32.9 G/DL (ref 31.5–36.5)
MCV RBC AUTO: 95 FL (ref 78–100)
MONOCYTES # BLD AUTO: 0.9 10E9/L (ref 0–1.3)
MONOCYTES NFR BLD AUTO: 9.6 %
NEUTROPHILS # BLD AUTO: 6.6 10E9/L (ref 1.6–8.3)
NEUTROPHILS NFR BLD AUTO: 67.7 %
NRBC # BLD AUTO: 0 10*3/UL
NRBC BLD AUTO-RTO: 0 /100
PLATELET # BLD AUTO: 210 10E9/L (ref 150–450)
POTASSIUM SERPL-SCNC: 4 MMOL/L (ref 3.4–5.3)
RBC # BLD AUTO: 3.9 10E12/L (ref 4.4–5.9)
SODIUM SERPL-SCNC: 142 MMOL/L (ref 133–144)
SPECIMEN SOURCE: ABNORMAL
WBC # BLD AUTO: 9.7 10E9/L (ref 4–11)

## 2020-02-04 PROCEDURE — 25000132 ZZH RX MED GY IP 250 OP 250 PS 637: Mod: GY | Performed by: INTERNAL MEDICINE

## 2020-02-04 PROCEDURE — 97116 GAIT TRAINING THERAPY: CPT | Mod: GP | Performed by: PHYSICAL THERAPIST

## 2020-02-04 PROCEDURE — 25000128 H RX IP 250 OP 636: Performed by: INTERNAL MEDICINE

## 2020-02-04 PROCEDURE — 25800030 ZZH RX IP 258 OP 636: Performed by: INTERNAL MEDICINE

## 2020-02-04 PROCEDURE — 80048 BASIC METABOLIC PNL TOTAL CA: CPT | Performed by: INTERNAL MEDICINE

## 2020-02-04 PROCEDURE — 99207 ZZC NON-BILLABLE SERV PER CHARTING: CPT | Performed by: PSYCHOLOGIST

## 2020-02-04 PROCEDURE — 36415 COLL VENOUS BLD VENIPUNCTURE: CPT | Performed by: INTERNAL MEDICINE

## 2020-02-04 PROCEDURE — 00000146 ZZHCL STATISTIC GLUCOSE BY METER IP

## 2020-02-04 PROCEDURE — 85025 COMPLETE CBC W/AUTO DIFF WBC: CPT | Performed by: INTERNAL MEDICINE

## 2020-02-04 PROCEDURE — 97530 THERAPEUTIC ACTIVITIES: CPT | Mod: GP | Performed by: PHYSICAL THERAPIST

## 2020-02-04 PROCEDURE — 25000125 ZZHC RX 250: Performed by: INTERNAL MEDICINE

## 2020-02-04 PROCEDURE — 99239 HOSP IP/OBS DSCHRG MGMT >30: CPT | Performed by: HOSPITALIST

## 2020-02-04 PROCEDURE — 25000132 ZZH RX MED GY IP 250 OP 250 PS 637: Mod: GY | Performed by: PSYCHIATRY & NEUROLOGY

## 2020-02-04 PROCEDURE — 97161 PT EVAL LOW COMPLEX 20 MIN: CPT | Mod: GP | Performed by: PHYSICAL THERAPIST

## 2020-02-04 PROCEDURE — 25000132 ZZH RX MED GY IP 250 OP 250 PS 637: Mod: GY | Performed by: HOSPITALIST

## 2020-02-04 PROCEDURE — 87880 STREP A ASSAY W/OPTIC: CPT | Performed by: HOSPITALIST

## 2020-02-04 RX ORDER — FOLIC ACID 1 MG/1
1 TABLET ORAL DAILY
Status: DISCONTINUED | OUTPATIENT
Start: 2020-02-04 | End: 2020-02-04 | Stop reason: HOSPADM

## 2020-02-04 RX ORDER — MULTIPLE VITAMINS W/ MINERALS TAB 9MG-400MCG
1 TAB ORAL DAILY
Status: DISCONTINUED | OUTPATIENT
Start: 2020-02-04 | End: 2020-02-04 | Stop reason: HOSPADM

## 2020-02-04 RX ORDER — CARBIDOPA AND LEVODOPA 25; 100 MG/1; MG/1
1 TABLET ORAL 3 TIMES DAILY
Qty: 90 TABLET | Refills: 0 | Status: SHIPPED | OUTPATIENT
Start: 2020-02-04 | End: 2020-06-04

## 2020-02-04 RX ORDER — AMOXICILLIN 500 MG/1
500 CAPSULE ORAL EVERY 12 HOURS SCHEDULED
Status: DISCONTINUED | OUTPATIENT
Start: 2020-02-04 | End: 2020-02-04 | Stop reason: HOSPADM

## 2020-02-04 RX ORDER — AMOXICILLIN 500 MG/1
500 CAPSULE ORAL EVERY 12 HOURS
Qty: 20 CAPSULE | Refills: 0 | Status: SHIPPED | OUTPATIENT
Start: 2020-02-04 | End: 2020-06-04

## 2020-02-04 RX ORDER — LANOLIN ALCOHOL/MO/W.PET/CERES
100 CREAM (GRAM) TOPICAL DAILY
Status: DISCONTINUED | OUTPATIENT
Start: 2020-02-04 | End: 2020-02-04 | Stop reason: HOSPADM

## 2020-02-04 RX ADMIN — ACETAMINOPHEN 650 MG: 325 TABLET, FILM COATED ORAL at 10:36

## 2020-02-04 RX ADMIN — MULTIPLE VITAMINS W/ MINERALS TAB 1 TABLET: TAB at 10:36

## 2020-02-04 RX ADMIN — FOLIC ACID 1 MG: 1 TABLET ORAL at 10:36

## 2020-02-04 RX ADMIN — METOPROLOL TARTRATE 2.5 MG: 5 INJECTION INTRAVENOUS at 06:42

## 2020-02-04 RX ADMIN — METFORMIN HYDROCHLORIDE 1000 MG: 500 TABLET ORAL at 09:42

## 2020-02-04 RX ADMIN — CARBIDOPA AND LEVODOPA 1 TABLET: 25; 100 TABLET ORAL at 09:42

## 2020-02-04 RX ADMIN — FOLIC ACID: 5 INJECTION, SOLUTION INTRAMUSCULAR; INTRAVENOUS; SUBCUTANEOUS at 02:22

## 2020-02-04 RX ADMIN — THIAMINE HCL TAB 100 MG 100 MG: 100 TAB at 10:36

## 2020-02-04 RX ADMIN — AMOXICILLIN 500 MG: 500 CAPSULE ORAL at 12:03

## 2020-02-04 RX ADMIN — LISINOPRIL 20 MG: 20 TABLET ORAL at 09:42

## 2020-02-04 ASSESSMENT — ACTIVITIES OF DAILY LIVING (ADL)
ADLS_ACUITY_SCORE: 21
ADLS_ACUITY_SCORE: 19

## 2020-02-04 NOTE — PLAN OF CARE
Pt A/O x3 - disoriented to situation but easily reorientable.  Pleasant and conversational.  VSS and afebrile.  Pt denies pain.  CMS intact.  Up A1 with walker and gait belt - pt seems to be improving well with Sinemet trial.  Voiding adequately - incontinent at times.  Tolerating mod carb diet well.  .  CIWA 0.  Will continue to monitor.

## 2020-02-04 NOTE — CONSULTS
2/4/2020 chem dep consult acknowledged.  Per EHR, patient has Medicare insurance so would be limited to programs that he could attend for substance use services.  I reviewed Dr. Hoyos's note from today, he is recommending discharge to home.  At this time, if patient is interested in seeking services for substance use he can be referred to [facility] program as they will accept Medicare.  He can schedule an evaluation on an outpatient basis at discharge if desired.  I left unit , Caitlyn JERRY, a voicemail and will email her the contact number for [facility] intake.

## 2020-02-04 NOTE — CONSULTS
"This document was created with voice recognition software.  As result, there may be errors in grammar and syntax.  Please consider this when reading this document.    Psychiatric consult, under supervision of Dr. Huston, ordered by Dr. Palomo.  This was an initial consult, which took a total of 80 minutes, with half the time coordinating care.    Summary of consult  Please see the H&P for detailed information about why Johny Renee was admitted.  For the purposes of this report, it should be noted that he is a 74-year-old  male who was brought to the [facility] ED by EMS due to aggressive behavior and alcohol intoxication.  His PMH is significant for hypertension, diabetes, and lower back surgery.  He put apparently had been at a bar with friends, became upset and aggressive, and EMS was summoned.  In the ED, he was \"aggressive and uncooperative) and eventually required restraints.  His wife provided collateral information, apparently this behavior is not unusual when he has been drinking, but he also does not drink regularly.  In the ED, his blood alcohol level was 0.10.       Johny was admitted under a hold patient for behavioral stabilization and assessment of complex behavioral problems.    I was asked to see this patient to help with the hospitalist team with psychiatric/behavioral differential diagnosis, assess suicidal and behavioral risk factors, facilitate a medication consultation with Dr. Huston, and to provide input into discharge planning.     Consultation with other team members  I conferred with Dr. Green.  We mutually agreed that no follow-up with psychiatric medication or referral to therapy is indicated.  The patient did tell me that he is interested in seeing what sounds like a health psychologist for help with behavioral management of diabetes and Parkinson's, and I recommended that he follow-up with his outpatient providers for referral to a health psychologist in their healthcare " "system.  -----------------------------------------------------------------------------------------    Records reviewed   H&P including review of systems,  as well as chart review including  previous admissions, Care Everywhere records and  the pharmacy admission note.     Progress notes/consults  Once patient cleared from sedating medications, he has been pleasant, cooperative, and engaging.    Previous treatment records  He does have a history of DUI, and of \"heavy drinking\" but did cut back after his DUI.  He also has a history of difficulties with the police.  He was assessed at the [facility] ED on 12-2-19, brought in by EMS after having a conflict with the police at a restaurant, was on a police hold for allegedly \"bizarre and belligerent behavior and non-compliance.\"  He apparently had been asked to leave the restaurant in the past, restaurant staff recognized him and a conflict developed after he was told that he would not be served.  His wife apparently told police that he may have been having a medical condition to try to avoid having him arrested, and the police brought him here.    A brief review of the Care Everywhere section did not locate any obvious psychiatric/behavioral treatment records.     Collateral Information  There was a note requesting that I call his daughter, Rachel, which I did.  She reports that she has been highly concerned about Johny for many years.  She does not believe that he is alcoholic, and also does not believe that he is depressed or anxious.  She describes him as a person who grew up during the depression, on a farm, and a very harsh and strict family environment, and is very concerned about his rights and also is \"controlling and domineering.\"  She believes that his primary problem is \"anger management issues\" in the context of having difficulty accepting his stage of life.  Example, he only recently excepted the need to hire help with his yard and other more physically demanding " "chores.    Patient Report of Admission Events/Circumstances  Johny clearly minimized his behavior that resulted in the police being called, and this admission.  He stated that he was at a restaurant, \"we decided to leave, they felt that I had too much to drink, they became aggressive.\"  When asked what indicated to him that they were \"aggressive,\" his response was, \"I did not like how they talk to me.\"  He blamed the police for being admitted, saying, \"police are always confrontational, I do not do well with that.\"    Mental and Chemical Health Treatment History    Johny has no history of any psychiatric or behavioral services.  When asked whether he has a history of problems with alcohol or drugs, his initial response was, \"no, not really.\"  I knew from the hospitalist note from yesterday that he had a DWI, and when I asked him about this he grudgingly acknowledged that he indeed has had actually 3 DWIs, with the last one occurring 6 years ago.  He was mandated to a sobriety class, but has not had CD treatment.  Since then, he has been mindful of how much he drinks when he is out, and has a breathalyzer in his car.  He denies drinking excessively, and reports that he drinks about once per week or less, when he is at home he has \"up to 3 or 4 drinks\" and when he is out \"I always keep it to a maximum of 2.\"    We discussed the impact of his recent diagnosis of Parkinson's.  He acknowledged that this is very difficult for him.  He also acknowledged that his PMD had recommended that he be assessed, because his father had Parkinson's, about 2 years ago and he resisted.  He acknowledged that the outcome of consultation with the neurologist yesterday was difficult for him, I encouraged him to follow-up with outpatient neurology and he was at least willing to consider this.    Social Background  Johny has been  for 57 years.  He has 3 children.  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  He is retired.  He and his wife live in their own home, and have no concern about finances.    Behavioral Assessment  Johny greeted me in a wary manner, had several questions about the purpose of the interview, but seems satisfied by my explanation and agreed to participate in the interview.  In fact, he relaxed quickly, and by the end of the interview we were chatting comfortably and overall he seemed collaborative, although several times he was defensive about his own behavior and minimized his own contributions to interpersonal problems.  It should be noted, however, that with me, once he relaxed, he was actually engaging, even charming.  This is in striking contrast to reports of his behaviors, multiple times, that has resulted in significant interpersonal conflicts.  I believe that this pattern is typical of somebody with significant narcissistic features; when it is to their advantage to be nice, they can be charming, but when they feel that they are being mistreated, they can be belligerent and aggressive.    Johny's appearance was notable for stocky build, gray, thick hair, and a generally vigorous appearance for his age.,  Today, he was oriented to person, circumstances, place and time. Eye contact was normal Johny's speech was fluent, logical and goal-directed.  It was not pressured, and he made no unusual statements. Attention and concentration were intact.  Johny is able to recall recent and remote events accurately. Working memory is intact. Cognitive functioning was consistent, with no fluctuation.  Associations are intact. IQ and fund of knowledge are cautiously estimated to be in the Above Average range.     No unusual movements were observed. Muscle strength/tone, gait and station are deferred to the hospitalist team    Johny described his current mood as \" normal.\"  Affect ranged from somber to euthymic.  He emphatically does not endorse concerns " about depression or anxiety symptoms.  He has no history of bipolar behavioral patterns or psychotic symptoms, and there were no obvious behavioral indications of problems with basic perceptual and cognitive process.     Insight/judgement are probably at baseline, currently are reasonable, have improved since admission.     Risk Assessment  Johny has no history of suicide attempts, and denies, convincingly, current suicidal ideation.  He does have a history, including a recent episode, of interpersonal belligerence and even aggressive behavior, but currently there are no indications of danger to others.    Impressions  Strengths: Johny was collaborative and seemed open in response to my questions.  He seems willing to accept his recent diagnosis of Parkinson's, and to follow-up with a neurologist in the community and to discuss referral to a health psychologist in the healthcare system.  Liabilities: Johny clearly has chronic problems with interpersonal sensitivities, resulting in some very serious interpersonal conflicts.    Diagnoses    Intermittent explosive disorder, likely narcissistic personality features, rule out narcissistic personality disorder, diabetes, Parkinson's, and other medical problems, per hospitalist team.    Recommendations   1.  The results of this consultation support discharge to home and community resources, once medically stable.    Delmy Acosta.[email or web address]., L.P.  [phone] (cell)  [phone] (office)

## 2020-02-04 NOTE — DISCHARGE INSTRUCTIONS
Follow up with [facility]        Appointments please call [phone]

## 2020-02-04 NOTE — PLAN OF CARE
PT: Orders received. PT evaluation completed and treatment initiated. Pt lives in a house with his spouse. 2 stairs to enter with single rail. Pt reports indep with mobility at baseline, but has a walker available to use if needed.     Discharge Planner PT   Patient plan for discharge: Home  Current status: Pt educated in PT role and POC, agreeable to session. Pt completes sit<>stand with SBA, increased time/effort, and use of UEs. Pt cued for safety. Pt ambulated 180' with use of FWW and CGA progressing to SBA. Pt cued for posture, walker management, and step length. It was noted that when the pt crossed a threshold that he had more shuffling to his gait, but with cuing was able to correct. Cues for walker management needed with turning. Extensive time spent discussing discharge recommendations, safety with mobility at home (ex: reduce distractions while ambulating), and safe bathing/showering. Pt and family agreeable to plan, and without questions upon leaving the room. Pt left sitting in bedside chair, chair alarm on, family and RN present.   Barriers to return to prior living situation: None anticipated  Recommendations for discharge: Home with use of FWW and HHPT  Rationale for recommendations: Anticipate that with continued IPPT the pt will be able to complete all mobility skills required for safe discharge home. Use of walker required for safety due to shuffling gait and balance. Rec HHPT at this time as leaving the home would be a taxing effort for the pt and likely require the assist of another person.        Entered by: Katarina Tarango 02/04/2020 11:35 AM         Physical Therapy Discharge Summary    Reason for therapy discharge:    Discharged to home.    Progress towards therapy goal(s). See goals on Care Plan in [facility] electronic health record for goal details.  Goals not met.  Barriers to achieving goals:   discharge on same date as initial evaluation.    Therapy recommendation(s):    Continued therapy is  recommended.  Rationale/Recommendations:  Rec HHPT, however appears that MD ordered OPPT.

## 2020-02-04 NOTE — DISCHARGE SUMMARY
"[facility]    Discharge Summary  Hospitalist    Date of Admission:  2/1/2020  Date of Discharge:  2/4/2020  Discharging Provider: León Green MD  Date of Service (when I saw the patient): 02/04/20    Discharge Diagnoses   Alcohol use disorder with dependence, acute intoxication and withdrawal  Chronic hypertension  Type 2 diabetes mellitus  Shuffling gait possibly secondary to Parkinson's.    History of Present Illness   \"This is a 74-year-old gentleman who was brought in for with above complaints via EMS.  The patient does not divulge much history to me.  Most of the history is obtained from the ER records.  He is a 74-year-old gentleman with a history of hypertension, diabetes, who apparently was at [facility], where he had some yue or such to drink with his pals, when apparently he became ornery with some aggressive behavior, and subsequently EMS was summoned, and he was brought into the ER for further evaluation.  In the ER over here, he was seen by Dr. Luna.  Apparently, he was aggressive and noncooperative.  He was put in restraints, and I am asked to admit him.  I did call his wife briefly.  She states that there has been no confusion, but that his behavior was not out of the norm following the drinking.  She is unable to tell me what exactly he drank; however, the patient states that he drank some yue.  I discussed his care with Dr. Luna over here in the ER, and I am asked to admit him for further evaluation. \"    Hospital Course   Johny Renee is a 74 year old male with a history of hypertension, type 2 diabetes, found on the ground outside of [facility] and restaurant.  Apparently the police called and he became belligerent and aggressive prompting EMS activation ultimately bring him in our emergency room.  In our emergency room he was aggressive and noncooperative, he was put in restraints, he was given multiple agents, him down including lorazepam and olanzapine, placed on a " hold and admitted to the ICU on observation for close monitoring on 2/1/2020.     The wife is also alcoholic and didn't remember how much he drank. He has previously had a relatively heavy drinking problem head had a DUI several years ago. He does have an angry and belligerent nature in particular when it comes to the police.  He had an altercation with them in December 2019 also prompting an ER visit at which time he was released to family.     #Acute alcohol intoxication: Resolved.  On the day of discharge patient was calm and cooperative.  Suspect that patient's initial presentation was secondary to his intoxication and underlying personality.  He was seen by psychiatry.  Deemed to have intermittent explosive disorder and likely narcissistic personality features without personality disorder.  Should follow-up with outpatient provider.  No need for referral to psychiatry or medications at this point.  He did not display any significant signs of withdrawal in the 24 hours prior to discharge.  Alcohol cessation was advised.    #Strep Throat: Patient was complaining of sore throat.  He did have evidence of tonsillar exudates.  Rapid strep was performed and positive.  He was started on a course of amoxicillin which should be continued on discharge.  He reported no allergies.     #Elevated troponin: Troponin was 0.058 and subsequently aria to 1.267. Troponin now down to 1.056.  Echocardiogram is without regional wall motion abnormalities.  This is in the setting of renal dysfunction with a BUN and creatinine of 30 and 1.73 on admission. EKGs without any acute changes.  Echocardiogram showed normal ejection fraction with borderline anteroapical hypokinesis. Consider stress testing while in hospital if he leaves I would recommend getting a stress test in the outpatient setting.  Continue daily aspirin     #DOMITILA: Admitting BUN/creatinine of 30 and 1.73.  Baseline creatinine seems to be between 1.2-1.3.  Creatinine trended  down with IV fluids.  Encourage p.o. intake.     #Hypertension: PTA regimen with lisinopril 20 mg daily. Kidney function improved.  Will resume lisinopril.       #Type 2 diabetes: On a combination of glyburide 5 mg daily and metformin 1 g p.o. twice daily.  Hemoglobin A1c 7.1% which suggest reasonable home control.  Continue home regimen on discharge.     #Shuffling gait: He stated that he has been having shuffling gait for about two years and his symptoms are getting worse. He stated that his father had Parkinson's.  Neurology was consulted.  He was started on Sinemet and did note some improvement in his shuffling gait.  He should continue this on discharge and should follow-up with neurology in the next several weeks.  Was seen by PT and deemed safe for discharge home with outpatient PT. he does have a 4 wheeled walker at home and was advised to use this.    León Green MD    Code Status   Full Code       Primary Care Physician   [facility]    Physical Exam   Temp: 97.7  F (36.5  C) Temp src: Oral BP: 132/66   Heart Rate: 75 Resp: 18 SpO2: 94 % O2 Device: None (Room air)    Vitals:    02/02/20 0130   Weight: 97.8 kg (215 lb 9.8 oz)     Vital Signs with Ranges  Temp:  [97.7  F (36.5  C)-98.9  F (37.2  C)] 97.7  F (36.5  C)  Heart Rate:  [75-86] 75  Resp:  [18-24] 18  BP: (130-157)/(64-85) 132/66  SpO2:  [94 %-96 %] 94 %  I/O last 3 completed shifts:  In: 480 [P.O.:480]  Out: 650 [Urine:650]    Patient is sitting in a chair.  No acute distress.  He is comfortable.  He answers questions appropriately and he is calm.  Heart is regular in rate and rhythm without murmur.  Lungs are clear bilaterally without any increased work of breathing.  Abdomen is soft and nondistended.  Lower extremities are warm and well-perfused.  No edema.  He moves all extremities.  No tremor noted.    Discharge Disposition   Discharged to home  Condition at discharge: Stable    Consultations This Hospital Stay   PSYCHIATRY IP  CONSULT  NEUROLOGY IP CONSULT  PHYSICAL THERAPY ADULT IP CONSULT  CHEMICAL DEPENDENCY IP CONSULT    Time Spent on this Encounter   ILeón MD, personally saw the patient today and spent greater than 30 minutes discharging this patient.    Discharge Orders      Home Care PT Referral for Hospital Discharge      Reason for your hospital stay    You were hospitalized for acute alcohol intoxication and agitation.  You were monitored and treated for withdrawal.  You were also seen by neurology for concern of possible Parkinson's.  You were started on a low dose Sinemet therapy which should be continued on discharge.  You should follow-up with both your PCP and with neurology in the coming weeks.     Follow-up and recommended labs and tests     Follow up with primary care provider, [facility], within 7 days for hospital follow- up.  The following labs/tests are recommended: BMP.    You should follow-up with neurology in the next 2 to 4 weeks for follow-up of possible Parkinson's.     Activity    Your activity upon discharge: activity as tolerated     MD face to face encounter    Documentation of Face to Face and Certification for Home Health Services    I certify that patient: Johny Renee is under my care and that I, or a nurse practitioner or physician's assistant working with me, had a face-to-face encounter that meets the physician face-to-face encounter requirements with this patient on: 2/4/2020.    This encounter with the patient was in whole, or in part, for the following medical condition, which is the primary reason for home health care: Generalized weakness.    I certify that, based on my findings, the following services are medically necessary home health services: Physical Therapy.    My clinical findings support the need for the above services because: Physical Therapy Services are needed to assess and treat the following functional impairments: Generalized weakness, possible  parkinson's.    Further, I certify that my clinical findings support that this patient is homebound (i.e. absences from home require considerable and taxing effort and are for medical reasons or [facility] services or infrequently or of short duration when for other reasons) because: Leaving home is medically contraindicated for the following reason(s): generalized weakness..    Based on the above findings. I certify that this patient is confined to the home and needs intermittent skilled nursing care, physical therapy and/or speech therapy.  The patient is under my care, and I have initiated the establishment of the plan of care.  This patient will be followed by a physician who will periodically review the plan of care.  Physician/Provider to provide follow up care: Francesco, Joy Loudon    [facility] hospital physician (the Medicare certified [place] provider): León Green MD  Physician Signature: See electronic signature associated with these discharge orders.  Date: 2/4/2020     Full Code     Diet    Follow this diet upon discharge: Orders Placed This Encounter      Moderate Consistent CHO Diet     Discharge Medications   Current Discharge Medication List      START taking these medications    Details   amoxicillin (AMOXIL) 500 MG capsule Take 1 capsule (500 mg) by mouth every 12 hours for 10 days  Qty: 20 capsule, Refills: 0    Associated Diagnoses: Strep throat      carbidopa-levodopa (SINEMET)  MG tablet Take 1 tablet by mouth 3 times daily  Qty: 90 tablet, Refills: 0    Associated Diagnoses: Shuffling gait         CONTINUE these medications which have NOT CHANGED    Details   ASPIRIN ADULT LOW STRENGTH PO Take 81 mg by mouth daily       GLYBURIDE PO Take 5 mg by mouth daily (with breakfast)       lisinopril (PRINIVIL/ZESTRIL) 20 MG tablet Take 20 mg by mouth daily       metFORMIN (GLUCOPHAGE) 1000 MG tablet Take 1,000 mg by mouth 2 times daily (with meals)            Allergies   No Known  Allergies  Data   Most Recent 3 CBC's:  Recent Labs   Lab Test 02/04/20  0732 02/03/20  0721 02/02/20  0559   WBC 9.7 12.0* 12.5*   HGB 12.2* 12.1* 12.7*   MCV 95 93 96    226 219      Most Recent 3 BMP's:  Recent Labs   Lab Test 02/04/20  0732 02/03/20  0721 02/02/20  0559    142 144   POTASSIUM 4.0 4.5 4.4   CHLORIDE 114* 113* 115*   CO2 23 24 22   BUN 20 18 25   CR 1.32* 1.25 1.38*   ANIONGAP 5 5 7   TORY 8.5 8.4* 8.6   * 184* 190*     Most Recent 2 LFT's:  Recent Labs   Lab Test 02/02/20  0559 02/01/20  1927   AST 43 18   ALT 30 30   ALKPHOS 48 47   BILITOTAL 0.5 0.2     Most Recent INR's and Anticoagulation Dosing History:  Anticoagulation Dose History     There is no flowsheet data to display.        Most Recent 3 Troponin's:  Recent Labs   Lab Test 02/03/20  0721 02/02/20  1202 02/02/20  0559   TROPI 1.056* 1.267* 1.110*     Most Recent Cholesterol Panel:No lab results found.  Most Recent 6 Bacteria Isolates From Any Culture (See EPIC Reports for Culture Details):No lab results found.  Most Recent TSH, T4 and A1c Labs:  Recent Labs   Lab Test 02/02/20  0201   A1C 7.1*

## 2020-02-04 NOTE — PROGRESS NOTES
02/04/20 1108   Quick Adds   Type of Visit Initial PT Evaluation   Living Environment   Lives With spouse   Living Arrangements house   Home Accessibility stairs to enter home;stairs within home   Number of Stairs, Main Entrance 2   Stair Railings, Main Entrance railing on left side (ascending)   Number of Stairs, Within Home, Primary 10   Stair Railings, Within Home, Primary railings on both sides of stairs   Self-Care   Usual Activity Tolerance good   Current Activity Tolerance moderate   Regular Exercise No   Equipment Currently Used at Home none   Activity/Exercise/Self-Care Comment Has access to a FWW at home   Functional Level Prior   Ambulation 0-->independent   Transferring 0-->independent   Toileting 0-->independent   Bathing 0-->independent   Fall history within last six months yes   Number of times patient has fallen within last six months 1   Which of the above functional risks had a recent onset or change? ambulation;transferring;toileting;bathing;dressing  (activity tolerance)   General Information   Onset of Illness/Injury or Date of Surgery - Date 02/03/20   Referring Physician Elle Palomo MD   Patient/Family Goals Statement Discharge to home   Pertinent History of Current Problem (include personal factors and/or comorbidities that impact the POC) Johny Renee is a 74 year old male with a history of hypertension, type 2 diabetes, found on the ground outside of [employer].  Found to be in ETOH withdrawal. Now cleared. Suspect potential parkinsons as well.    General Observations Pt sitting in bedside chair, agreeable to session.    Cognitive Status Examination   Orientation orientation to person, place and time  (Not situation)   Level of Consciousness alert   Follows Commands and Answers Questions 100% of the time   Personal Safety and Judgment at risk behaviors demonstrated   Memory intact   Pain Assessment   Patient Currently in Pain No   Integumentary/Edema  "  Integumentary/Edema no deficits were identifed   Posture    Posture Forward head position;Protracted shoulders   Range of Motion (ROM)   ROM Comment WFL   Strength   Strength Comments Decreased functional strength as seen by pt's need for UE assist with transfers   Bed Mobility   Bed Mobility Comments Sit<>supine not assessed as session begins and ends in chair   Transfer Skills   Transfer Comments sit<>stand SBA   Gait   Gait Comments CGA with FWW   Balance   Balance Comments Requires B UE support for safe gait   Sensory Examination   Sensory Perception Comments Baseline numbness to finger tips   Coordination   Coordination no deficits were identified   General Therapy Interventions   Planned Therapy Interventions balance training;bed mobility training;gait training;strengthening;stretching;transfer training;home program guidelines;progressive activity/exercise   Clinical Impression   Criteria for Skilled Therapeutic Intervention yes, treatment indicated   PT Diagnosis Impaired functional mobility   Influenced by the following impairments Strength, balance, cognition/safety awareness   Functional limitations due to impairments Difficulty with sit<>stand transfers, ambulation   Clinical Presentation Stable/Uncomplicated   Clinical Presentation Rationale Medically stable, nearing discharge   Clinical Decision Making (Complexity) Low complexity   Therapy Frequency 5x/week   Predicted Duration of Therapy Intervention (days/wks) 3 days   Anticipated Discharge Disposition Home with Home Therapy   Risk & Benefits of therapy have been explained Yes   Patient, Family & other staff in agreement with plan of care Yes   [facility] [employer] TM \"6 Clicks\"   2016, Trustees of [facility], under license to [employer].  All rights reserved.   6 Clicks Short Forms Basic Mobility Inpatient Short Form   [facility] [employer]  \"6 Clicks\" V.2 Basic Mobility Inpatient Short Form   1. Turning from your back to your side " while in a flat bed without using bedrails? 4 - None   2. Moving from lying on your back to sitting on the side of a flat bed without using bedrails? 3 - A Little   3. Moving to and from a bed to a chair (including a wheelchair)? 3 - A Little   4. Standing up from a chair using your arms (e.g., wheelchair, or bedside chair)? 4 - None   5. To walk in hospital room? 3 - A Little   6. Climbing 3-5 steps with a railing? 3 - A Little   Basic Mobility Raw Score (Score out of 24.Lower scores equate to lower levels of function) 20   Total Evaluation Time   Total Evaluation Time (Minutes) 8

## 2020-02-04 NOTE — PLAN OF CARE
Pt is a/o, up with A1, gait belt and walker. VSS. BS were 155 and 234. Pain in throat area and sore when swallowing. Pt has white poxs on tosils. Rapid test for strep was positive. Was seen by PT and is agreeable to home PT. Seen by  psych he did not recommend any med changes. Patient's After Visit Summary was reviewed with patient and/or spouse and daughter [place].   Patient verbalized understanding of After Visit Summary, recommended follow up and was given an opportunity to ask questions.   Discharge medications sent home with patient/family: RX    Discharged with daughter to transport home. All belongings , clothes and cell phone sent home with pt.

## 2020-02-04 NOTE — PLAN OF CARE
Pt is alert to self and time. Up with A1 walker and gait belt. Unsteady gate- shuffles.  VSS. BS were 184,243, and 193. CIWA score was 6, 0,and 0.  At beginning of shift pt was angry irritated with staff and cares. Sitter was discontinued at 1000 and by mid day pt was pleasant, calling appropriately and making jokes. Incontinent of urine at times. Appetite is fair.  Psychology, PT and neurology consulted today. Neurology started Sinemet. Plan to see PT and Neurology tomorrow, discharge pending.

## 2020-02-05 NOTE — PROGRESS NOTES
Post discharge note:    Reynold contacted the pt to follow-up for HC needs.  The pt was discharged yesterday, but a HC agency was not identified with the pt prior to his discharge.  Reynold called the pt who agreed with HC.  He requested [facility] and said that he recently saw his PCP.    Reynold also offered CD resources and an OP CD assessment at [facility], but the pt declined saying that he doesn't need it.    Reynold spoke with [facility] who said that the pt saw his PCP, Dr. Mendoza, on 1/6/2020.  Reynold sent the referral to [facility] via email.    MAURY Springer, [facility]  Inpatient Care Coordination  [facility]  [phone]

## 2020-03-22 ENCOUNTER — HEALTH MAINTENANCE LETTER (OUTPATIENT)
Age: 75
End: 2020-03-22

## 2020-06-04 ENCOUNTER — OFFICE VISIT (OUTPATIENT)
Dept: FAMILY MEDICINE | Facility: CLINIC | Age: 75
End: 2020-06-04
Payer: MEDICARE

## 2020-06-04 VITALS
OXYGEN SATURATION: 98 % | DIASTOLIC BLOOD PRESSURE: 64 MMHG | BODY MASS INDEX: 31.42 KG/M2 | SYSTOLIC BLOOD PRESSURE: 130 MMHG | TEMPERATURE: 99.1 F | HEART RATE: 107 BPM | WEIGHT: 219 LBS

## 2020-06-04 DIAGNOSIS — I10 HTN (HYPERTENSION), BENIGN: ICD-10-CM

## 2020-06-04 DIAGNOSIS — E11.9 TYPE 2 DIABETES MELLITUS WITHOUT COMPLICATION, WITHOUT LONG-TERM CURRENT USE OF INSULIN (H): Primary | ICD-10-CM

## 2020-06-04 DIAGNOSIS — E78.2 MIXED HYPERLIPIDEMIA: ICD-10-CM

## 2020-06-04 PROCEDURE — 99204 OFFICE O/P NEW MOD 45 MIN: CPT | Performed by: FAMILY MEDICINE

## 2020-06-04 RX ORDER — ATORVASTATIN CALCIUM 20 MG/1
20 TABLET, FILM COATED ORAL DAILY
Qty: 360 TABLET | Refills: 0 | Status: SHIPPED | OUTPATIENT
Start: 2020-06-04 | End: 2020-08-24 | Stop reason: SINTOL

## 2020-06-04 RX ORDER — LISINOPRIL 20 MG/1
20 TABLET ORAL DAILY
Qty: 365 TABLET | Refills: 0 | Status: SHIPPED | OUTPATIENT
Start: 2020-06-04 | End: 2021-06-08

## 2020-06-04 RX ORDER — GLYBURIDE 5 MG/1
5 TABLET ORAL
Qty: 365 TABLET | Refills: 0 | Status: SHIPPED | OUTPATIENT
Start: 2020-06-04 | End: 2021-07-29

## 2020-06-04 SDOH — HEALTH STABILITY: MENTAL HEALTH: HOW OFTEN DO YOU HAVE A DRINK CONTAINING ALCOHOL?: 2-4 TIMES A MONTH

## 2020-06-04 NOTE — PROGRESS NOTES
Subjective     Johny Renee is a 75 year old male who presents to clinic today for the following health issues:    HPI   Diabetes Follow-up    How often are you checking your blood sugar? A few times a month  What time of day are you checking your blood sugars (select all that apply)?  Before meals  Have you had any blood sugars above 200?  No  Have you had any blood sugars below 70?  No    What symptoms do you notice when your blood sugar is low?  None    What concerns do you have today about your diabetes? None     Do you have any of these symptoms? (Select all that apply)  Numbness in feet and Burning in feet      BP Readings from Last 2 Encounters:   06/04/20 130/64   02/04/20 132/66     Hemoglobin A1C (%)   Date Value   02/02/2020 7.1 (H)               Hypertension Follow-up      Do you check your blood pressure regularly outside of the clinic? No     Are you following a low salt diet? Yes    Are your blood pressures ever more than 140 on the top number (systolic) OR more   than 90 on the bottom number (diastolic), for example 140/90? No      How many servings of fruits and vegetables do you eat daily?  2-3    On average, how many sweetened beverages do you drink each day (Examples: soda, juice, sweet tea, etc.  Do NOT count diet or artificially sweetened beverages)?   0    How many days per week do you exercise enough to make your heart beat faster? 3 or less    How many minutes a day do you exercise enough to make your heart beat faster? 9 or less    How many days per week do you miss taking your medication? 0    Denies any headaches, lightheadedness, dizziness, vision changes, chest pain, palpitations, shortness of breath, dyspnea, numbness/tingling.      Patient Active Problem List   Diagnosis     Alcohol intoxication (H)     Diabetes mellitus, type 2 (H)     HTN (hypertension), benign     Mixed hyperlipidemia     Past Surgical History:   Procedure Laterality Date     BACK SURGERY      disc surgery x2      ENT SURGERY      nasal polyp removal several times     GENITOURINARY SURGERY      vasectomy       Social History     Tobacco Use     Smoking status: Former Smoker     Packs/day: 1.00     Years: 15.00     Pack years: 15.00     Types: Cigarettes     Smokeless tobacco: Never Used     Tobacco comment: quit in the 1980s   Substance Use Topics     Alcohol use: Yes     Frequency: 2-4 times a month     Comment: 1 drink daily     Family History   Problem Relation Age of Onset     Parkinsonism Father      Diabetes Type 2  Mother            Reviewed and updated as needed this visit by Provider  Tobacco  Allergies  Meds  Problems  Med Hx  Surg Hx  Fam Hx         Review of Systems   Constitutional, HEENT, cardiovascular, pulmonary, gi and gu systems are negative, except as otherwise noted.      Objective    /64   Pulse 107   Temp 99.1  F (37.3  C) (Tympanic)   Wt 99.3 kg (219 lb)   SpO2 98%   BMI 31.42 kg/m    Body mass index is 31.42 kg/m .  Physical Exam   GENERAL: healthy, alert and no distress  RESP: lungs clear to auscultation - no rales, rhonchi or wheezes  CV: regular rate and rhythm, normal S1 S2, no S3 or S4, no murmur, click or rub, no peripheral edema and peripheral pulses strong  MS: no gross musculoskeletal defects noted, no edema  PSYCH: mentation appears normal, affect normal/bright    Diagnostic Test Results:  Labs reviewed in Epic        Assessment & Plan     1. Type 2 diabetes mellitus without complication, without long-term current use of insulin (H): well-controlled. Continue current regimen and follow up in 6 months.  - glyBURIDE (DIABETA /MICRONASE) 5 MG tablet; Take 1 tablet (5 mg) by mouth daily (with breakfast)  Dispense: 365 tablet; Refill: 0  - metFORMIN (GLUCOPHAGE) 1000 MG tablet; Take 1 tablet (1,000 mg) by mouth 2 times daily (with meals)  Dispense: 730 tablet; Refill: 0    2. HTN (hypertension), benign: stable. Continue lisinopril  - lisinopril (ZESTRIL) 20 MG tablet; Take 1  tablet (20 mg) by mouth daily  Dispense: 365 tablet; Refill: 0    3. Mixed hyperlipidemia: stable, continue Lipitor.  - atorvastatin (LIPITOR) 20 MG tablet; Take 1 tablet (20 mg) by mouth daily  Dispense: 360 tablet; Refill: 0       Return in about 6 months (around 12/4/2020) for Diabetes follow-up, BP Recheck.    León Hayden, [facility] [place]

## 2020-08-21 NOTE — PROGRESS NOTES
Subjective     Johny Renee is a 75 year old male who presents to clinic today for the following health issues:    HPI     Diabetes Follow-up    How often are you checking your blood sugar? A few times a week  What time of day are you checking your blood sugars (select all that apply)?  Before meals  Have you had any blood sugars above 200?  Yes a couple  Have you had any blood sugars below 70?  No    What symptoms do you notice when your blood sugar is low?  None    What concerns do you have today about your diabetes? None     Do you have any of these symptoms? (Select all that apply)  No numbness or tingling in feet.  No redness, sores or blisters on feet.  No complaints of excessive thirst.  No reports of blurry vision.  No significant changes to weight.    Have you had a diabetic eye exam in the last 12 months? Yes- Date of last eye exam: unable to remember,  Location: [facility] -- thinks it was in the past year    Current medications include metformin 1,000 mg PO BID, glyburide 5 mg PO daily                Hyperlipidemia Follow-Up      Are you regularly taking any medication or supplement to lower your cholesterol?   Yes- atorvastatin 20 mg PO daily    Are you having muscle aches or other side effects that you think could be caused by your cholesterol lowering medication?  Yes- having some muscle issues    Hypertension Follow-up      Do you check your blood pressure regularly outside of the clinic? No     Are you following a low salt diet? No    Are your blood pressures ever more than 140 on the top number (systolic) OR more   than 90 on the bottom number (diastolic), for example 140/90? No    BP Readings from Last 2 Encounters:   08/24/20 126/64   06/04/20 130/64     Hemoglobin A1C (%)   Date Value   02/02/2020 7.1 (H)         How many servings of fruits and vegetables do you eat daily?  0-1    On average, how many sweetened beverages do you drink each day (Examples: soda, juice, sweet  "tea, etc.  Do NOT count diet or artificially sweetened beverages)?   0    How many days per week do you exercise enough to make your heart beat faster? 3 or less    How many minutes a day do you exercise enough to make your heart beat faster? 9 or less    How many days per week do you miss taking your medication? 0    Denies any headaches, lightheadedness, dizziness, vision changes, chest pain, palpitations, shortness of breath, dyspnea, numbness/tingling.      Review of Systems   Constitutional, HEENT, cardiovascular, pulmonary, gi and gu systems are negative, except as otherwise noted.      Objective    /64   Pulse 78   Temp 98.2  F (36.8  C) (Tympanic)   Ht 1.778 m (5' 10\")   Wt 96.6 kg (213 lb)   SpO2 99%   BMI 30.56 kg/m    Body mass index is 30.56 kg/m .  Physical Exam   GENERAL: healthy, alert and no distress, shuffling gait  RESP: lungs clear to auscultation - no rales, rhonchi or wheezes  CV: regular rate and rhythm, normal S1 S2, no S3 or S4, no murmur, click or rub, no peripheral edema and peripheral pulses strong  MS: no gross musculoskeletal defects noted, no edema  Diabetic foot exam: normal DP and PT pulses, no trophic changes or ulcerative lesions and abnormal monofilament exam - no points felt          Assessment & Plan   1. Type 2 diabetes mellitus with diabetic polyneuropathy, without long-term current use of insulin (H): historically well controlled. Will try decreasing GI side effects by changing to XR formulation of metformin. Continue glyburide. Will recheck labs. He does have neuropathy and advised good footwear and keeping an eye on his feet.  Encouraged eye exam.   - FOOT EXAM  - Lipid panel reflex to direct LDL Fasting  - Albumin Random Urine Quantitative with Creat Ratio  - HEMOGLOBIN A1C  - metFORMIN (GLUCOPHAGE-XR) 500 MG 24 hr tablet; Take 2 tablets (1,000 mg) by mouth 2 times daily (with meals)  Dispense: 360 tablet; Refill: 3    2. HTN (hypertension), benign: BP well " "controlled. Continue lisinopril  - Albumin Random Urine Quantitative with Creat Ratio  - Comprehensive metabolic panel (BMP + Alb, Alk Phos, ALT, AST, Total. Bili, TP)    3. Mixed hyperlipidemia: stable. Given muscle pains, will discontinue statin and see how he he feels  - Lipid panel reflex to direct LDL Fasting    - We should consider doing a stress test in the future to evaluate his heart given past history of NSTEMI in February 2020.    - He also has shuffling gait and if no improvement in ambulation with discontinuation of statin, we should consider consultation with neurologist for Parkinsonism -- had taken Sinemet after hospitalization but discontinued due to headaches.    - We also discussed updating immunizations (PCV13, Tetanus, and getting influenza shot in the fall) -- he declined all of these.      BMI:   Estimated body mass index is 30.56 kg/m  as calculated from the following:    Height as of this encounter: 1.778 m (5' 10\").    Weight as of this encounter: 96.6 kg (213 lb).   Weight management plan: Discussed healthy diet and exercise guidelines        Return in about 6 months (around 2/24/2021) for Diabetes follow-up.    León Hayden, DO  [facility] [place]    Answers for HPI/ROS submitted by the patient on 8/24/2020   Chronic problems general questions HPI Form  If you checked off any problems, how difficult have these problems made it for you to do your work, take care of things at home, or get along with other people?: Not difficult at all  PHQ9 TOTAL SCORE: 0  ROBERT 7 TOTAL SCORE: 0    "

## 2020-08-24 ENCOUNTER — OFFICE VISIT (OUTPATIENT)
Dept: FAMILY MEDICINE | Facility: CLINIC | Age: 75
End: 2020-08-24
Payer: MEDICARE

## 2020-08-24 VITALS
TEMPERATURE: 98.2 F | HEART RATE: 78 BPM | SYSTOLIC BLOOD PRESSURE: 126 MMHG | BODY MASS INDEX: 30.49 KG/M2 | OXYGEN SATURATION: 99 % | HEIGHT: 70 IN | DIASTOLIC BLOOD PRESSURE: 64 MMHG | WEIGHT: 213 LBS

## 2020-08-24 DIAGNOSIS — I10 HTN (HYPERTENSION), BENIGN: ICD-10-CM

## 2020-08-24 DIAGNOSIS — E11.42 TYPE 2 DIABETES MELLITUS WITH DIABETIC POLYNEUROPATHY, WITHOUT LONG-TERM CURRENT USE OF INSULIN (H): Primary | ICD-10-CM

## 2020-08-24 DIAGNOSIS — E78.2 MIXED HYPERLIPIDEMIA: ICD-10-CM

## 2020-08-24 LAB — HBA1C MFR BLD: 7.3 % (ref 0–5.6)

## 2020-08-24 PROCEDURE — 80061 LIPID PANEL: CPT | Performed by: FAMILY MEDICINE

## 2020-08-24 PROCEDURE — 99214 OFFICE O/P EST MOD 30 MIN: CPT | Performed by: FAMILY MEDICINE

## 2020-08-24 PROCEDURE — 82043 UR ALBUMIN QUANTITATIVE: CPT | Performed by: FAMILY MEDICINE

## 2020-08-24 PROCEDURE — 99207 C FOOT EXAM  NO CHARGE: CPT | Performed by: FAMILY MEDICINE

## 2020-08-24 PROCEDURE — 36415 COLL VENOUS BLD VENIPUNCTURE: CPT | Performed by: FAMILY MEDICINE

## 2020-08-24 PROCEDURE — 80053 COMPREHEN METABOLIC PANEL: CPT | Performed by: FAMILY MEDICINE

## 2020-08-24 PROCEDURE — 83036 HEMOGLOBIN GLYCOSYLATED A1C: CPT | Performed by: FAMILY MEDICINE

## 2020-08-24 RX ORDER — METFORMIN HCL 500 MG
1000 TABLET, EXTENDED RELEASE 24 HR ORAL 2 TIMES DAILY WITH MEALS
Qty: 360 TABLET | Refills: 3 | Status: SHIPPED | OUTPATIENT
Start: 2020-08-24 | End: 2022-05-06

## 2020-08-24 ASSESSMENT — ANXIETY QUESTIONNAIRES
4. TROUBLE RELAXING: NOT AT ALL
6. BECOMING EASILY ANNOYED OR IRRITABLE: NOT AT ALL
GAD7 TOTAL SCORE: 0
7. FEELING AFRAID AS IF SOMETHING AWFUL MIGHT HAPPEN: NOT AT ALL
5. BEING SO RESTLESS THAT IT IS HARD TO SIT STILL: NOT AT ALL
GAD7 TOTAL SCORE: 0
GAD7 TOTAL SCORE: 0
7. FEELING AFRAID AS IF SOMETHING AWFUL MIGHT HAPPEN: NOT AT ALL
2. NOT BEING ABLE TO STOP OR CONTROL WORRYING: NOT AT ALL
1. FEELING NERVOUS, ANXIOUS, OR ON EDGE: NOT AT ALL
3. WORRYING TOO MUCH ABOUT DIFFERENT THINGS: NOT AT ALL

## 2020-08-24 ASSESSMENT — PATIENT HEALTH QUESTIONNAIRE - PHQ9
10. IF YOU CHECKED OFF ANY PROBLEMS, HOW DIFFICULT HAVE THESE PROBLEMS MADE IT FOR YOU TO DO YOUR WORK, TAKE CARE OF THINGS AT HOME, OR GET ALONG WITH OTHER PEOPLE: NOT DIFFICULT AT ALL
SUM OF ALL RESPONSES TO PHQ QUESTIONS 1-9: 0
SUM OF ALL RESPONSES TO PHQ QUESTIONS 1-9: 0

## 2020-08-24 ASSESSMENT — MIFFLIN-ST. JEOR: SCORE: 1697.41

## 2020-08-24 NOTE — PATIENT INSTRUCTIONS
Patient Education     Understanding Coronary Calcium Scan    A coronary calcium scan is a test that looks at the arteries that supply blood to the heart muscle. These are called the coronary arteries. The scan checks for plaque buildup along the walls of these arteries. Plaque is made up of calcium, fat, cholesterol, and other substances. A buildup of plaque narrows the arteries. This affects the flow of blood to the heart muscle. If a coronary artery becomes completely blocked, a heart attack (death of part of the heart muscle) can result. Knowing how much plaque you have in your arteries can help figure out your risk for a heart attack.  Why do I need a coronary calcium scan?  A coronary calcium scan measures the amount of calcium in the arteries. The presence of calcium deposits in an artery means that plaque is starting to build up.  The results of the test are given as a calcium score. The scan can help predict your risk of having a heart attack, even before symptoms appear.  This scan isn t for everyone. It is most useful when considered along with other risk factors for a heart attack. These include family history of heart disease, high blood pressure, and unhealthy cholesterol.  What happens during a coronary calcium scan?  The scan is done using computed tomography (CT). This test uses X-rays and computers to create detailed images of the heart.  For the test, you lie on a platform that slides into a tube. During the scan:    You will need to stay very still.    You may be asked to hold your breath for short periods of time.    You may have small sticky discs attached to wires (electrodes) on your chest to record your heartbeat.  The test will likely take about 10 minutes. Once the test is done and your appointment is finished, you can go back to your normal routine.  What are the risks of coronary calcium scan?  A CT scan exposes you to a certain amount of radiation. The benefits of the scan likely  outweigh the risks for you. You and your healthcare provider can discuss this further.  Date Last Reviewed: 5/1/2016 2000-2019 [employer]. [address] [place] [address], [place] [address]. All rights reserved. This information is not intended as a substitute for professional medical care. Always follow your healthcare professional's instructions.        -We are changing your metformin to an extended release formulation to hopefully help with the GI side effects.  - We should consider doing a stress test in the future to evaluate your heart  - If you don't notice improvement in your walking with discontinuation of your statin, we should consider consultation with a neurologist for Parkinsonism given your  family history.

## 2020-08-24 NOTE — LETTER
August 27, 2020      Johny Renee  [address] [place] [address]        Dear ,    We are writing to inform you of your test results.    -HDL(good) cholesterol level is low which can increase your heart disease risk.  A diet high in fat and simple carbohydrates, genetics and being overweight can contribute to this. Your LDL(bad) cholesterol and trigylceride levels are normal.  ADVISE: exercising 150 minutes of aerobic exercise per week (30 minutes 5 days per week or 50 minutes 3 days per week are options) is helpful to improve this.  In 12 months, you should recheck your fasting cholesterol panel by scheduling a lab-only appointment.   -Liver and gallbladder tests (ALT,AST, Alk phos,bilirubin) are normal.   -Kidney function (GFR) is decreased but stable. We will continue to monitor this.   -Sodium is normal.   -Potassium is normal.   -Calcium is normal.   -Glucose is elevated due to your diabetes.   -A1C (test of diabetes control the last 2-3 months) is at your goal. Please continue with your current plan. Also, you should make an appointment to see me and recheck your A1C test in 6 months.   -Microalbumin (urine protein) test is normal.  ADVISE: rechecking this annually.     Resulted Orders   Lipid panel reflex to direct LDL Fasting   Result Value Ref Range    Cholesterol 98 <200 mg/dL    Triglycerides 120 <150 mg/dL      Comment:      Fasting specimen    HDL Cholesterol 38 (L) >39 mg/dL    LDL Cholesterol Calculated 36 <100 mg/dL      Comment:      Desirable:       <100 mg/dl    Non HDL Cholesterol 60 <130 mg/dL   Albumin Random Urine Quantitative with Creat Ratio   Result Value Ref Range    Creatinine Urine 139 mg/dL    Albumin Urine mg/L 18 mg/L    Albumin Urine mg/g Cr 13.31 0 - 17 mg/g Cr   HEMOGLOBIN A1C   Result Value Ref Range    Hemoglobin A1C 7.3 (H) 0 - 5.6 %      Comment:      Normal <5.7% Prediabetes 5.7-6.4%  Diabetes 6.5% or higher - adopted from ADA   consensus guidelines.      Comprehensive metabolic panel (BMP + Alb, Alk Phos, ALT, AST, Total. Bili, TP)   Result Value Ref Range    Sodium 140 133 - 144 mmol/L    Potassium 5.2 3.4 - 5.3 mmol/L    Chloride 112 (H) 94 - 109 mmol/L    Carbon Dioxide 21 20 - 32 mmol/L    Anion Gap 7 3 - 14 mmol/L    Glucose 139 (H) 70 - 99 mg/dL      Comment:      Fasting specimen    Urea Nitrogen 31 (H) 7 - 30 mg/dL    Creatinine 1.49 (H) 0.66 - 1.25 mg/dL    GFR Estimate 44 (L) >60 mL/min/[1.73_m2]      Comment:      Non  GFR Calc  Starting 12/18/2018, serum creatinine based estimated GFR (eGFR) will be   calculated using the Chronic Kidney Disease Epidemiology Collaboration   (CKD-EPI) equation.      GFR Estimate If Black 52 (L) >60 mL/min/[1.73_m2]      Comment:       GFR Calc  Starting 12/18/2018, serum creatinine based estimated GFR (eGFR) will be   calculated using the Chronic Kidney Disease Epidemiology Collaboration   (CKD-EPI) equation.      Calcium 9.6 8.5 - 10.1 mg/dL    Bilirubin Total 0.7 0.2 - 1.3 mg/dL    Albumin 3.7 3.4 - 5.0 g/dL    Protein Total 7.2 6.8 - 8.8 g/dL    Alkaline Phosphatase 48 40 - 150 U/L    ALT 26 0 - 70 U/L    AST 17 0 - 45 U/L       If you have any questions or concerns, please call the clinic at the number listed above.       Sincerely,        León Hayden, DO

## 2020-08-25 LAB
ALBUMIN SERPL-MCNC: 3.7 G/DL (ref 3.4–5)
ALP SERPL-CCNC: 48 U/L (ref 40–150)
ALT SERPL W P-5'-P-CCNC: 26 U/L (ref 0–70)
ANION GAP SERPL CALCULATED.3IONS-SCNC: 7 MMOL/L (ref 3–14)
AST SERPL W P-5'-P-CCNC: 17 U/L (ref 0–45)
BILIRUB SERPL-MCNC: 0.7 MG/DL (ref 0.2–1.3)
BUN SERPL-MCNC: 31 MG/DL (ref 7–30)
CALCIUM SERPL-MCNC: 9.6 MG/DL (ref 8.5–10.1)
CHLORIDE SERPL-SCNC: 112 MMOL/L (ref 94–109)
CHOLEST SERPL-MCNC: 98 MG/DL
CO2 SERPL-SCNC: 21 MMOL/L (ref 20–32)
CREAT SERPL-MCNC: 1.49 MG/DL (ref 0.66–1.25)
CREAT UR-MCNC: 139 MG/DL
GFR SERPL CREATININE-BSD FRML MDRD: 44 ML/MIN/{1.73_M2}
GLUCOSE SERPL-MCNC: 139 MG/DL (ref 70–99)
HDLC SERPL-MCNC: 38 MG/DL
LDLC SERPL CALC-MCNC: 36 MG/DL
MICROALBUMIN UR-MCNC: 18 MG/L
MICROALBUMIN/CREAT UR: 13.31 MG/G CR (ref 0–17)
NONHDLC SERPL-MCNC: 60 MG/DL
POTASSIUM SERPL-SCNC: 5.2 MMOL/L (ref 3.4–5.3)
PROT SERPL-MCNC: 7.2 G/DL (ref 6.8–8.8)
SODIUM SERPL-SCNC: 140 MMOL/L (ref 133–144)
TRIGL SERPL-MCNC: 120 MG/DL

## 2020-08-25 ASSESSMENT — ANXIETY QUESTIONNAIRES: GAD7 TOTAL SCORE: 0

## 2021-01-15 ENCOUNTER — HEALTH MAINTENANCE LETTER (OUTPATIENT)
Age: 76
End: 2021-01-15

## 2021-01-21 ENCOUNTER — OFFICE VISIT (OUTPATIENT)
Dept: FAMILY MEDICINE | Facility: CLINIC | Age: 76
End: 2021-01-21
Payer: MEDICARE

## 2021-01-21 VITALS
TEMPERATURE: 98.6 F | HEART RATE: 91 BPM | WEIGHT: 213 LBS | DIASTOLIC BLOOD PRESSURE: 76 MMHG | SYSTOLIC BLOOD PRESSURE: 128 MMHG | BODY MASS INDEX: 30.56 KG/M2 | OXYGEN SATURATION: 100 %

## 2021-01-21 DIAGNOSIS — R26.89 SHUFFLING GAIT: ICD-10-CM

## 2021-01-21 DIAGNOSIS — I10 HTN (HYPERTENSION), BENIGN: ICD-10-CM

## 2021-01-21 DIAGNOSIS — F10.230 ALCOHOL DEPENDENCE WITH UNCOMPLICATED WITHDRAWAL (H): ICD-10-CM

## 2021-01-21 DIAGNOSIS — N18.30 STAGE 3 CHRONIC KIDNEY DISEASE, UNSPECIFIED WHETHER STAGE 3A OR 3B CKD (H): ICD-10-CM

## 2021-01-21 DIAGNOSIS — E11.42 TYPE 2 DIABETES MELLITUS WITH DIABETIC POLYNEUROPATHY, WITHOUT LONG-TERM CURRENT USE OF INSULIN (H): Primary | ICD-10-CM

## 2021-01-21 DIAGNOSIS — I25.2 HISTORY OF NON-ST ELEVATION MYOCARDIAL INFARCTION (NSTEMI): ICD-10-CM

## 2021-01-21 DIAGNOSIS — E78.2 MIXED HYPERLIPIDEMIA: ICD-10-CM

## 2021-01-21 LAB
HBA1C MFR BLD: 7.5 % (ref 0–5.6)
VIT B12 SERPL-MCNC: 228 PG/ML (ref 193–986)

## 2021-01-21 PROCEDURE — 99214 OFFICE O/P EST MOD 30 MIN: CPT | Performed by: FAMILY MEDICINE

## 2021-01-21 PROCEDURE — 36415 COLL VENOUS BLD VENIPUNCTURE: CPT | Performed by: FAMILY MEDICINE

## 2021-01-21 PROCEDURE — 82607 VITAMIN B-12: CPT | Performed by: FAMILY MEDICINE

## 2021-01-21 PROCEDURE — 80053 COMPREHEN METABOLIC PANEL: CPT | Performed by: FAMILY MEDICINE

## 2021-01-21 PROCEDURE — 83036 HEMOGLOBIN GLYCOSYLATED A1C: CPT | Performed by: FAMILY MEDICINE

## 2021-01-21 PROCEDURE — 80061 LIPID PANEL: CPT | Performed by: FAMILY MEDICINE

## 2021-01-21 RX ORDER — ATORVASTATIN CALCIUM 20 MG/1
20 TABLET, FILM COATED ORAL DAILY
COMMUNITY
Start: 2020-06-05 | End: 2021-01-21

## 2021-01-21 NOTE — LETTER
[facility]  [address], [place] [address] [phone]                    January 22, 2021    Johny Renee  [address] [place] [address]      Dear Johny,    Here is a summary of your recent test results:    -LDL(bad) cholesterol level is elevated, and your triglycerides are elevated which can increase your heart disease risk.  A diet high in fat and simple carbohydrates, genetics and being overweight can contribute to this. ADVISE: exercising 150 minutes of aerobic exercise per week (30 minutes for 5 days per week or 50 minutes for 3 days per week are options), eating a low saturated fat/low carbohydrate diet, and omega-3 fatty acids (fish oil) 3670-6522 mg daily are helpful to improve this. In 12 months, you should recheck your fasting cholesterol panel.   -Liver and gallbladder tests (ALT,AST, Alk phos,bilirubin) are normal.   -Kidney function (GFR) is decreased. Make sure you are drinking plenty of water. We will recheck this in 6 months.   -Sodium is normal.   -Potassium is normal.   -Calcium is normal.   -Glucose is elevated due to your diabetes.   -A1C (test of diabetes control the last 2-3 months) is at your goal. Please continue with your current plan. Also, you should make an appointment to see me and recheck your A1C test in 6 months.   -Vitamin B12 level is normal.     For additional lab test information, [email or web address] is an excellent reference.     Your test results are enclosed.      Please contact me if you have any questions.    In addition, here is a list of due or overdue Health Maintenance reminders.    Health Maintenance Due   Topic Date Due     Discuss Advance Care Planning  1945     Hepatitis C Screening  03/07/1963     Annual Wellness Visit  03/07/2008     FALL RISK ASSESSMENT  03/07/2008     Colorectal Cancer Screening  05/10/2013     Eye Exam  01/01/2021     PHQ-2  01/01/2021       Please call us at [phone] (or use  Julia) to address the above recommendations.            Thank you very much for trusting [facility]     Healthy regards,  Dr. León Hayden, DO           Results for orders placed or performed in visit on 01/21/21   Hemoglobin A1c     Status: Abnormal   Result Value Ref Range    Hemoglobin A1C 7.5 (H) 0 - 5.6 %   Lipid panel reflex to direct LDL Fasting     Status: Abnormal   Result Value Ref Range    Cholesterol 199 <200 mg/dL    Triglycerides 187 (H) <150 mg/dL    HDL Cholesterol 40 >39 mg/dL    LDL Cholesterol Calculated 122 (H) <100 mg/dL    Non HDL Cholesterol 159 (H) <130 mg/dL   Comprehensive metabolic panel (BMP + Alb, Alk Phos, ALT, AST, Total. Bili, TP)     Status: Abnormal   Result Value Ref Range    Sodium 140 133 - 144 mmol/L    Potassium 4.8 3.4 - 5.3 mmol/L    Chloride 109 94 - 109 mmol/L    Carbon Dioxide 23 20 - 32 mmol/L    Anion Gap 8 3 - 14 mmol/L    Glucose 148 (H) 70 - 99 mg/dL    Urea Nitrogen 25 7 - 30 mg/dL    Creatinine 1.67 (H) 0.66 - 1.25 mg/dL    GFR Estimate 39 (L) >60 mL/min/[1.73_m2]    GFR Estimate If Black 45 (L) >60 mL/min/[1.73_m2]    Calcium 9.0 8.5 - 10.1 mg/dL    Bilirubin Total 0.5 0.2 - 1.3 mg/dL    Albumin 3.8 3.4 - 5.0 g/dL    Protein Total 7.3 6.8 - 8.8 g/dL    Alkaline Phosphatase 51 40 - 150 U/L    ALT 31 0 - 70 U/L    AST 17 0 - 45 U/L   Vitamin B12     Status: None   Result Value Ref Range    Vitamin B12 228 193 - 986 pg/mL

## 2021-01-21 NOTE — PROGRESS NOTES
Assessment & Plan     Type 2 diabetes mellitus with diabetic polyneuropathy, without long-term current use of insulin (H): well controlled. Will recheck A1c today. Continue metformin and glyburide.  - Hemoglobin A1c  - Comprehensive metabolic panel (BMP + Alb, Alk Phos, ALT, AST, Total. Bili, TP)    HTN (hypertension), benign: at goal. Continue lisinopril.    Mixed hyperlipidemia: recheck lipids. He has discontinued atorvastatin due to muscle aches.  - Lipid panel reflex to direct LDL Fasting    Shuffling gait: had taken Sinemet while hospitalized last year with some improvement of gait but caused headaches. Advised consultation with neurology. Referral placed.  - NEUROLOGY ADULT REFERRAL  - Comprehensive metabolic panel (BMP + Alb, Alk Phos, ALT, AST, Total. Bili, TP)  - Vitamin B12    History of non-ST elevation myocardial infarction (NSTEMI): advise completing stress test.    Stage 3 chronic kidney disease, unspecified whether stage 3a or 3b CKD: recheck kidney function    7. Alcohol dependence with uncomplicated withdrawal (H): check LFT          No follow-ups on file.    León Hayden DO  [facility]      .[facility]  Jeff     Johny is a 75 year old who presents to clinic today for the following health issues     HPI       Diabetes Follow-up      How often are you checking your blood sugar? Not at all when checked 130-160    What concerns do you have today about your diabetes? None     Do you have any of these symptoms? (Select all that apply)  No numbness or tingling in feet.  No redness, sores or blisters on feet.  No complaints of excessive thirst.  No reports of blurry vision.  No significant changes to weight.    Have you had a diabetic eye exam in the last 12 months? No              Hyperlipidemia Follow-Up      Are you regularly taking any medication or supplement to lower your cholesterol?   Yes- statin    Are you having muscle aches or other side effects that you think  could be caused by your cholesterol lowering medication?  No    Hypertension Follow-up      Do you check your blood pressure regularly outside of the clinic? No     Are you following a low salt diet? Yes    Are your blood pressures ever more than 140 on the top number (systolic) OR more   than 90 on the bottom number (diastolic), for example 140/90? No     Denies any headaches, lightheadedness, dizziness, vision changes, chest pain, palpitations, shortness of breath, dyspnea, numbness/tingling.      BP Readings from Last 2 Encounters:   01/21/21 128/76   08/24/20 126/64     Hemoglobin A1C (%)   Date Value   08/24/2020 7.3 (H)   02/02/2020 7.1 (H)     LDL Cholesterol Calculated (mg/dL)   Date Value   08/24/2020 36         How many servings of fruits and vegetables do you eat daily?  0-1    On average, how many sweetened beverages do you drink each day (Examples: soda, juice, sweet tea, etc.  Do NOT count diet or artificially sweetened beverages)?   0    How many days per week do you exercise enough to make your heart beat faster? no    How many minutes a day do you exercise enough to make your heart beat faster? n/a    How many days per week do you miss taking your medication? 0      Review of Systems   Constitutional, HEENT, cardiovascular, pulmonary, gi and gu systems are negative, except as otherwise noted.      Objective    /76   Pulse 91   Temp 98.6  F (37  C) (Tympanic)   Wt 96.6 kg (213 lb)   SpO2 100%   BMI 30.56 kg/m    Body mass index is 30.56 kg/m .  Physical Exam   GENERAL: healthy, alert and no distress  RESP: lungs clear to auscultation - no rales, rhonchi or wheezes  CV: regular rate and rhythm, normal S1 S2, no S3 or S4, no murmur, click or rub, no peripheral edema and peripheral pulses strong  NEURO: Normal strength and tone, sensory exam grossly normal, mentation intact and gait abnormal: shuffling

## 2021-01-22 LAB
ALBUMIN SERPL-MCNC: 3.8 G/DL (ref 3.4–5)
ALP SERPL-CCNC: 51 U/L (ref 40–150)
ALT SERPL W P-5'-P-CCNC: 31 U/L (ref 0–70)
ANION GAP SERPL CALCULATED.3IONS-SCNC: 8 MMOL/L (ref 3–14)
AST SERPL W P-5'-P-CCNC: 17 U/L (ref 0–45)
BILIRUB SERPL-MCNC: 0.5 MG/DL (ref 0.2–1.3)
BUN SERPL-MCNC: 25 MG/DL (ref 7–30)
CALCIUM SERPL-MCNC: 9 MG/DL (ref 8.5–10.1)
CHLORIDE SERPL-SCNC: 109 MMOL/L (ref 94–109)
CHOLEST SERPL-MCNC: 199 MG/DL
CO2 SERPL-SCNC: 23 MMOL/L (ref 20–32)
CREAT SERPL-MCNC: 1.67 MG/DL (ref 0.66–1.25)
GFR SERPL CREATININE-BSD FRML MDRD: 39 ML/MIN/{1.73_M2}
GLUCOSE SERPL-MCNC: 148 MG/DL (ref 70–99)
HDLC SERPL-MCNC: 40 MG/DL
LDLC SERPL CALC-MCNC: 122 MG/DL
NONHDLC SERPL-MCNC: 159 MG/DL
POTASSIUM SERPL-SCNC: 4.8 MMOL/L (ref 3.4–5.3)
PROT SERPL-MCNC: 7.3 G/DL (ref 6.8–8.8)
SODIUM SERPL-SCNC: 140 MMOL/L (ref 133–144)
TRIGL SERPL-MCNC: 187 MG/DL

## 2021-02-01 PROBLEM — N18.30 CHRONIC KIDNEY DISEASE, STAGE 3 (H): Status: ACTIVE | Noted: 2021-02-01

## 2021-05-14 ENCOUNTER — OFFICE VISIT (OUTPATIENT)
Dept: FAMILY MEDICINE | Facility: CLINIC | Age: 76
End: 2021-05-14
Payer: MEDICARE

## 2021-05-14 VITALS
RESPIRATION RATE: 22 BRPM | SYSTOLIC BLOOD PRESSURE: 140 MMHG | TEMPERATURE: 98.2 F | OXYGEN SATURATION: 97 % | DIASTOLIC BLOOD PRESSURE: 70 MMHG | BODY MASS INDEX: 31.14 KG/M2 | WEIGHT: 217 LBS | HEART RATE: 85 BPM

## 2021-05-14 DIAGNOSIS — N28.9 DECREASED RENAL FUNCTION: ICD-10-CM

## 2021-05-14 DIAGNOSIS — E11.42 TYPE 2 DIABETES MELLITUS WITH DIABETIC POLYNEUROPATHY, WITHOUT LONG-TERM CURRENT USE OF INSULIN (H): ICD-10-CM

## 2021-05-14 DIAGNOSIS — R60.0 LOWER EXTREMITY EDEMA: Primary | ICD-10-CM

## 2021-05-14 PROCEDURE — 99215 OFFICE O/P EST HI 40 MIN: CPT | Performed by: PHYSICIAN ASSISTANT

## 2021-05-14 RX ORDER — HYDROCHLOROTHIAZIDE 12.5 MG/1
12.5 TABLET ORAL DAILY
Qty: 30 TABLET | Refills: 0 | Status: SHIPPED | OUTPATIENT
Start: 2021-05-14 | End: 2022-05-06

## 2021-05-14 RX ORDER — VITAMIN B COMPLEX
25 TABLET ORAL DAILY
COMMUNITY
Start: 2021-05-14

## 2021-05-14 NOTE — PROGRESS NOTES
Assessment & Plan     Lower extremity edema - L>R, mostly pedal  Given obvious asymmetry will check d-dimer. If positive will obtain LE US to r/o DVT. Otherwise, risk seems low given no apparent precipitant, palpable cord, or significant calf asymmetry. Hx more suggestive of venous insufficiency as swelling, erythema improves upon elevation of legs. Strongly encouraged to start using his compression stockings again. BP elevated as well though so will had low dose hydrochlorothiazide over this next week to see if this helps as well. Reviewed may not use long-term as pt otherwise reports baseline BP control is good and with decreased renal function will need to be cautious of dehydration and to avoid exacerbating this. Consider repeat BMP in 1 week.  - D dimer, quantitative  - hydrochlorothiazide (HYDRODIURIL) 12.5 MG tablet; Take 1 tablet (12.5 mg) by mouth daily    Decreased renal function  May have CKD as Cr generally range from 1.32-1.73, GFR 37-55 over the past 1 year. Encouraged to stay well-hydrated and will need to ensure BP stays in good control. Encouraged to consider home cuff for ongoing monitoring.  - Basic metabolic panel  (Ca, Cl, CO2, Creat, Gluc, K, Na, BUN)    Type 2 diabetes mellitus with diabetic polyneuropathy, without long-term current use of insulin (H)  Repeat a1c today as pt reports some temporarily worse BS control, but now improved since getting back on the wagon with healthy habits.  - HEMOGLOBIN A1C    Review of the result(s) of each unique test - per lab review section  Ordering of each unique test  Prescription drug management  54 minutes spent on the date of the encounter doing chart review, history and exam, documentation and further activities per the note        Return in about 1 week (around 5/21/2021) for Recheck LE swelling and BP.    Aubrie Syed PA-C  [facility]      ADDENDUM-------  Checked in with lab on status of d-dimer. Reports patient  "was asked to wear his mask per CDC guidelines in light of current COVID19 pandemic at which point he refused and left clinic without getting blood drawn. Pt was called and we discussed CDC guidance, safety protocols at which point he stated \"why would I come to a clinic that's full of disease and that doesn't have a good ventilation system?\" We reviewed how masking prevents spread of COVID19 and [place] protocols for virtual care to keep active cases outside of clinic. We discussed sign/sx of DVT and PE and seeking care in ER over the weekend with any red flags. Discussed having him return or an alternative lab site at which point he stated \"I'm not going to, goodbye\" and hung up the phone on me.  Electronically Signed By: Aubrie Syed PA-C        Jeff Mccalin is a 76 year old who presents for the following health issues:    HPI   Diabetes Follow-up; reports was running high a couple weeks and attributes to \"Wasn't watching what I was eating\" so \"now 'I'm back on the band-wagon\".   Had been in the 200's, but now is back down into the 90's. Once in awhile will check before bedtime and was in the 90's as well. Would like to have a1c today to recheck things.  Reports good compliance with metformin 1000mg BID and glyburide 5mg daily.  How often are you checking your blood sugar? A few times a week  What time of day are you checking your blood sugars (select all that apply)?  Before meals  Have you had any blood sugars above 200?  Yes sometimes  Have you had any blood sugars below 70?  No    What symptoms do you notice when your blood sugar is low?  None    What concerns do you have today about your diabetes? None     Do you have any of these symptoms? (Select all that apply)  Numbness in feet - more decreased sensation, but rarely will get some N/T. Feels this just a little though. Sometimes in bilateral hands as well.     Have you had a diabetic eye exam in the last 12 months? No    Hypertension " "Follow-up; reports typically in good control with just lisinopril 20mg daily. No home cuff.    Do you check your blood pressure regularly outside of the clinic? No     Are you following a low salt diet? Yes    Are your blood pressures ever more than 140 on the top number (systolic) OR more   than 90 on the bottom number (diastolic), for example 140/90? No    BP Readings from Last 2 Encounters:   05/14/21 (!) 149/78   01/21/21 128/76     Hemoglobin A1C (%)   Date Value   01/21/2021 7.5 (H)   08/24/2020 7.3 (H)     LDL Cholesterol Calculated (mg/dL)   Date Value   01/21/2021 122 (H)   08/24/2020 36         How many servings of fruits and vegetables do you eat daily?  2-3    On average, how many sweetened beverages do you drink each day (Examples: soda, juice, sweet tea, etc.  Do NOT count diet or artificially sweetened beverages)?   0    How many days per week do you exercise enough to make your heart beat faster? 3 or less    How many minutes a day do you exercise enough to make your heart beat faster? 9 or less    How many days per week do you miss taking your medication? 0    Musculoskeletal problem/pain; believes he had a little bit in his R foot 1 month ago, but resolved. Not sure how long it lasted.   Onset/Duration: 1 week ago; seems to be improving.  Description  Location: foot - left  Joint Swelling: YES  Redness: YES  Pain: YES - comes/goes. Describes like a \"ayush horse\", but no pattern that he can identify that causes it. All of sudden just grabs him the resolves within a few seconds. Usually doesn't get any swelling in his L foot, but admits he has been told to wear compression stockings in the past, but doesn't because they're uncomfortable.  No calf pain  Non-smoker   No hx of surgery or long car ride/trip.   Wonders if he has poor circulation.   No prior trauma to his L leg.   No CP, SOB or hemoptysis.  Weight stable. Denies orthopnea.    Warmth: YES  Intensity:  moderate  Progression of Symptoms:  " worsening  Accompanying signs and symptoms:   Fevers: no  Numbness/tingling/weakness: YES  History  Trauma to the area: no  Recent illness:  no  Previous similar problem: yes of R foot, but not to the same severity.   Previous evaluation:  no  Precipitating or alleviating factors:  Aggravating factors include: none  Therapies tried and outcome: nothing    Current Outpatient Medications   Medication     glyBURIDE (DIABETA /MICRONASE) 5 MG tablet     lisinopril (ZESTRIL) 20 MG tablet     metFORMIN (GLUCOPHAGE-XR) 500 MG 24 hr tablet     No current facility-administered medications for this visit.      Allergies   Allergen Reactions     Simvastatin Muscle Pain (Myalgia)     Review of Systems   Constitutional, HEENT, cardiovascular, pulmonary, gi and gu systems are negative, except as otherwise noted.      Objective    BP (!) 149/78   Pulse 85   Temp 98.2  F (36.8  C)   Resp 22   Wt 98.4 kg (217 lb)   SpO2 97%   BMI 31.14 kg/m    Body mass index is 31.14 kg/m .  Physical Exam   GENERAL: healthy, alert and no distress  EYES: Eyes grossly normal to inspection, PERRL and conjunctivae and sclerae normal  RESP: lungs clear to auscultation - no rales, rhonchi or wheezes  CV/SKIN: regular rates and rhythm, no murmur, click or rub. Trace R LE edema to mid shin with a small area of scabbing on dorsal foot which pt attributes to wear his shoes rub. 1+ left pedal and lower extremity pitting edema to mid shin which is obviously asymmetric from the R. No calf tenderness or palpable cord. Calf circumference minimally larger on L when compared to R 17.5in, 17.25in respectively. Does have some increased redness of dorsal foot and toes L>R, but this improves up elevation of LE so is not c/w cellulitis and seems vascular in nature. No petechiae or purpura. PT and DP intact, but decrease. No hair on toes.  MSK: no TTP to L great to or ball of foot.    Office Visit on 01/21/2021   Component Date Value Ref Range Status     Hemoglobin  A1C 01/21/2021 7.5* 0 - 5.6 % Final    Comment: Normal <5.7% Prediabetes 5.7-6.4%  Diabetes 6.5% or higher - adopted from ADA   consensus guidelines.       Cholesterol 01/21/2021 199  <200 mg/dL Final     Triglycerides 01/21/2021 187* <150 mg/dL Final    Comment: Borderline high:  150-199 mg/dl  High:             200-499 mg/dl  Very high:       >499 mg/dl       HDL Cholesterol 01/21/2021 40  >39 mg/dL Final     LDL Cholesterol Calculated 01/21/2021 122* <100 mg/dL Final    Comment: Above desirable:  100-129 mg/dl  Borderline High:  130-159 mg/dL  High:             160-189 mg/dL  Very high:       >189 mg/dl       Non HDL Cholesterol 01/21/2021 159* <130 mg/dL Final    Comment: Above Desirable:  130-159 mg/dl  Borderline high:  160-189 mg/dl  High:             190-219 mg/dl  Very high:       >219 mg/dl       Sodium 01/21/2021 140  133 - 144 mmol/L Final     Potassium 01/21/2021 4.8  3.4 - 5.3 mmol/L Final     Chloride 01/21/2021 109  94 - 109 mmol/L Final     Carbon Dioxide 01/21/2021 23  20 - 32 mmol/L Final     Anion Gap 01/21/2021 8  3 - 14 mmol/L Final     Glucose 01/21/2021 148* 70 - 99 mg/dL Final     Urea Nitrogen 01/21/2021 25  7 - 30 mg/dL Final     Creatinine 01/21/2021 1.67* 0.66 - 1.25 mg/dL Final     GFR Estimate 01/21/2021 39* >60 mL/min/[1.73_m2] Final    Comment: Non  GFR Calc  Starting 12/18/2018, serum creatinine based estimated GFR (eGFR) will be   calculated using the Chronic Kidney Disease Epidemiology Collaboration   (CKD-EPI) equation.       GFR Estimate If Black 01/21/2021 45* >60 mL/min/[1.73_m2] Final    Comment:  GFR Calc  Starting 12/18/2018, serum creatinine based estimated GFR (eGFR) will be   calculated using the Chronic Kidney Disease Epidemiology Collaboration   (CKD-EPI) equation.       Calcium 01/21/2021 9.0  8.5 - 10.1 mg/dL Final     Bilirubin Total 01/21/2021 0.5  0.2 - 1.3 mg/dL Final     Albumin 01/21/2021 3.8  3.4 - 5.0 g/dL Final     Protein  Total 01/21/2021 7.3  6.8 - 8.8 g/dL Final     Alkaline Phosphatase 01/21/2021 51  40 - 150 U/L Final     ALT 01/21/2021 31  0 - 70 U/L Final     AST 01/21/2021 17  0 - 45 U/L Final     Vitamin B12 01/21/2021 228  193 - 986 pg/mL Final

## 2021-05-16 ENCOUNTER — HEALTH MAINTENANCE LETTER (OUTPATIENT)
Age: 76
End: 2021-05-16

## 2021-06-07 ENCOUNTER — TELEPHONE (OUTPATIENT)
Dept: FAMILY MEDICINE | Facility: CLINIC | Age: 76
End: 2021-06-07

## 2021-06-07 NOTE — TELEPHONE ENCOUNTER
Patient requesting a one year supply  of lisinopril as a WRITTEN  RX.  He will stop in to pick it up.     Please call him when it is ready to be picked up       Ashleigh Burns

## 2021-06-07 NOTE — TELEPHONE ENCOUNTER
Patient calling again, upset that his RX is not ready.   Ii had told him this morning ( and again just now) that it could take up to 3 days.   Says he is out and needs it now.       Ashleigh Burns

## 2021-07-27 DIAGNOSIS — E11.9 TYPE 2 DIABETES MELLITUS WITHOUT COMPLICATION, WITHOUT LONG-TERM CURRENT USE OF INSULIN (H): ICD-10-CM

## 2021-07-28 NOTE — TELEPHONE ENCOUNTER
Routing refill request to provider for review/approval because:  Labs out of range:    Labs not current:      Beverly Nelson RN, BSN  [facility]

## 2021-07-29 RX ORDER — GLYBURIDE 5 MG/1
5 TABLET ORAL
Qty: 365 TABLET | Refills: 0 | Status: SHIPPED | OUTPATIENT
Start: 2021-07-29

## 2021-09-05 ENCOUNTER — HEALTH MAINTENANCE LETTER (OUTPATIENT)
Age: 76
End: 2021-09-05

## 2021-12-26 ENCOUNTER — HEALTH MAINTENANCE LETTER (OUTPATIENT)
Age: 76
End: 2021-12-26

## 2022-04-17 ENCOUNTER — HEALTH MAINTENANCE LETTER (OUTPATIENT)
Age: 77
End: 2022-04-17

## 2022-05-06 ENCOUNTER — APPOINTMENT (OUTPATIENT)
Dept: ULTRASOUND IMAGING | Facility: CLINIC | Age: 77
End: 2022-05-06
Attending: EMERGENCY MEDICINE
Payer: MEDICARE

## 2022-05-06 ENCOUNTER — HOSPITAL ENCOUNTER (OUTPATIENT)
Facility: CLINIC | Age: 77
Setting detail: OBSERVATION
Discharge: HOME | End: 2022-05-08
Attending: EMERGENCY MEDICINE | Admitting: INTERNAL MEDICINE
Payer: MEDICARE

## 2022-05-06 ENCOUNTER — APPOINTMENT (OUTPATIENT)
Dept: CT IMAGING | Facility: CLINIC | Age: 77
End: 2022-05-06
Attending: EMERGENCY MEDICINE
Payer: MEDICARE

## 2022-05-06 DIAGNOSIS — E86.0 DEHYDRATION: ICD-10-CM

## 2022-05-06 DIAGNOSIS — R52 PAIN: Primary | ICD-10-CM

## 2022-05-06 DIAGNOSIS — G20.A1 PARKINSON DISEASE (H): ICD-10-CM

## 2022-05-06 DIAGNOSIS — U07.1 INFECTION DUE TO 2019 NOVEL CORONAVIRUS: ICD-10-CM

## 2022-05-06 DIAGNOSIS — M62.81 GENERALIZED MUSCLE WEAKNESS: ICD-10-CM

## 2022-05-06 DIAGNOSIS — R79.89 ELEVATED TROPONIN: ICD-10-CM

## 2022-05-06 LAB
ALBUMIN SERPL-MCNC: 3.6 G/DL (ref 3.4–5)
ALP SERPL-CCNC: 47 U/L (ref 40–150)
ALT SERPL W P-5'-P-CCNC: 37 U/L (ref 0–70)
ANION GAP SERPL CALCULATED.3IONS-SCNC: 10 MMOL/L (ref 3–14)
ANION GAP SERPL CALCULATED.3IONS-SCNC: 12 MMOL/L (ref 3–14)
AST SERPL W P-5'-P-CCNC: 30 U/L (ref 0–45)
BASOPHILS # BLD AUTO: 0 10E3/UL (ref 0–0.2)
BASOPHILS # BLD AUTO: 0 10E3/UL (ref 0–0.2)
BASOPHILS NFR BLD AUTO: 1 %
BASOPHILS NFR BLD AUTO: 1 %
BILIRUB SERPL-MCNC: 0.3 MG/DL (ref 0.2–1.3)
BUN SERPL-MCNC: 32 MG/DL (ref 7–30)
BUN SERPL-MCNC: 33 MG/DL (ref 7–30)
CALCIUM SERPL-MCNC: 8.9 MG/DL (ref 8.5–10.1)
CALCIUM SERPL-MCNC: 8.9 MG/DL (ref 8.5–10.1)
CHLORIDE BLD-SCNC: 114 MMOL/L (ref 94–109)
CHLORIDE BLD-SCNC: 116 MMOL/L (ref 94–109)
CK SERPL-CCNC: 162 U/L (ref 30–300)
CO2 SERPL-SCNC: 18 MMOL/L (ref 20–32)
CO2 SERPL-SCNC: 18 MMOL/L (ref 20–32)
CREAT SERPL-MCNC: 1.86 MG/DL (ref 0.66–1.25)
CREAT SERPL-MCNC: 1.89 MG/DL (ref 0.66–1.25)
EOSINOPHIL # BLD AUTO: 0.1 10E3/UL (ref 0–0.7)
EOSINOPHIL # BLD AUTO: 0.1 10E3/UL (ref 0–0.7)
EOSINOPHIL NFR BLD AUTO: 1 %
EOSINOPHIL NFR BLD AUTO: 1 %
ERYTHROCYTE [DISTWIDTH] IN BLOOD BY AUTOMATED COUNT: 12.5 % (ref 10–15)
ERYTHROCYTE [DISTWIDTH] IN BLOOD BY AUTOMATED COUNT: 12.6 % (ref 10–15)
GFR SERPL CREATININE-BSD FRML MDRD: 36 ML/MIN/1.73M2
GFR SERPL CREATININE-BSD FRML MDRD: 36 ML/MIN/1.73M2
GLUCOSE BLD-MCNC: 91 MG/DL (ref 70–99)
GLUCOSE BLD-MCNC: 91 MG/DL (ref 70–99)
HCT VFR BLD AUTO: 35.3 % (ref 40–53)
HCT VFR BLD AUTO: 39 % (ref 40–53)
HGB BLD-MCNC: 11.8 G/DL (ref 13.3–17.7)
HGB BLD-MCNC: 12.6 G/DL (ref 13.3–17.7)
HOLD SPECIMEN: NORMAL
IMM GRANULOCYTES # BLD: 0 10E3/UL
IMM GRANULOCYTES # BLD: 0.1 10E3/UL
IMM GRANULOCYTES NFR BLD: 0 %
IMM GRANULOCYTES NFR BLD: 1 %
INR PPP: 1.13 (ref 0.85–1.15)
LACTATE SERPL-SCNC: 1.7 MMOL/L (ref 0.7–2)
LYMPHOCYTES # BLD AUTO: 0.5 10E3/UL (ref 0.8–5.3)
LYMPHOCYTES # BLD AUTO: 0.7 10E3/UL (ref 0.8–5.3)
LYMPHOCYTES NFR BLD AUTO: 10 %
LYMPHOCYTES NFR BLD AUTO: 7 %
MAGNESIUM SERPL-MCNC: 1.8 MG/DL (ref 1.6–2.3)
MCH RBC QN AUTO: 30.6 PG (ref 26.5–33)
MCH RBC QN AUTO: 31 PG (ref 26.5–33)
MCHC RBC AUTO-ENTMCNC: 32.3 G/DL (ref 31.5–36.5)
MCHC RBC AUTO-ENTMCNC: 33.4 G/DL (ref 31.5–36.5)
MCV RBC AUTO: 93 FL (ref 78–100)
MCV RBC AUTO: 95 FL (ref 78–100)
MONOCYTES # BLD AUTO: 0.9 10E3/UL (ref 0–1.3)
MONOCYTES # BLD AUTO: 0.9 10E3/UL (ref 0–1.3)
MONOCYTES NFR BLD AUTO: 12 %
MONOCYTES NFR BLD AUTO: 12 %
NEUTROPHILS # BLD AUTO: 5.5 10E3/UL (ref 1.6–8.3)
NEUTROPHILS # BLD AUTO: 5.6 10E3/UL (ref 1.6–8.3)
NEUTROPHILS NFR BLD AUTO: 75 %
NEUTROPHILS NFR BLD AUTO: 79 %
NRBC # BLD AUTO: 0 10E3/UL
NRBC # BLD AUTO: 0 10E3/UL
NRBC BLD AUTO-RTO: 0 /100
NRBC BLD AUTO-RTO: 0 /100
PLATELET # BLD AUTO: 190 10E3/UL (ref 150–450)
PLATELET # BLD AUTO: 202 10E3/UL (ref 150–450)
POTASSIUM BLD-SCNC: 4.4 MMOL/L (ref 3.4–5.3)
POTASSIUM BLD-SCNC: 4.4 MMOL/L (ref 3.4–5.3)
PROT SERPL-MCNC: 7 G/DL (ref 6.8–8.8)
RBC # BLD AUTO: 3.81 10E6/UL (ref 4.4–5.9)
RBC # BLD AUTO: 4.12 10E6/UL (ref 4.4–5.9)
SARS-COV-2 RNA RESP QL NAA+PROBE: POSITIVE
SODIUM SERPL-SCNC: 144 MMOL/L (ref 133–144)
SODIUM SERPL-SCNC: 144 MMOL/L (ref 133–144)
TROPONIN I SERPL HS-MCNC: 137 NG/L
TSH SERPL DL<=0.005 MIU/L-ACNC: 1.49 MU/L (ref 0.4–4)
WBC # BLD AUTO: 7 10E3/UL (ref 4–11)
WBC # BLD AUTO: 7.4 10E3/UL (ref 4–11)

## 2022-05-06 PROCEDURE — G0378 HOSPITAL OBSERVATION PER HR: HCPCS

## 2022-05-06 PROCEDURE — 85014 HEMATOCRIT: CPT | Performed by: EMERGENCY MEDICINE

## 2022-05-06 PROCEDURE — C9803 HOPD COVID-19 SPEC COLLECT: HCPCS

## 2022-05-06 PROCEDURE — 250N000013 HC RX MED GY IP 250 OP 250 PS 637: Performed by: EMERGENCY MEDICINE

## 2022-05-06 PROCEDURE — 82077 ASSAY SPEC XCP UR&BREATH IA: CPT | Performed by: EMERGENCY MEDICINE

## 2022-05-06 PROCEDURE — 84484 ASSAY OF TROPONIN QUANT: CPT | Performed by: EMERGENCY MEDICINE

## 2022-05-06 PROCEDURE — 36415 COLL VENOUS BLD VENIPUNCTURE: CPT | Performed by: EMERGENCY MEDICINE

## 2022-05-06 PROCEDURE — 96361 HYDRATE IV INFUSION ADD-ON: CPT

## 2022-05-06 PROCEDURE — 80053 COMPREHEN METABOLIC PANEL: CPT | Performed by: EMERGENCY MEDICINE

## 2022-05-06 PROCEDURE — G1004 CDSM NDSC: HCPCS

## 2022-05-06 PROCEDURE — 99219 PR INITIAL OBSERVATION CARE,LEVEL II: CPT | Performed by: INTERNAL MEDICINE

## 2022-05-06 PROCEDURE — 83605 ASSAY OF LACTIC ACID: CPT | Performed by: EMERGENCY MEDICINE

## 2022-05-06 PROCEDURE — 99285 EMERGENCY DEPT VISIT HI MDM: CPT | Mod: 25

## 2022-05-06 PROCEDURE — U0005 INFEC AGEN DETEC AMPLI PROBE: HCPCS | Performed by: EMERGENCY MEDICINE

## 2022-05-06 PROCEDURE — 83735 ASSAY OF MAGNESIUM: CPT | Performed by: EMERGENCY MEDICINE

## 2022-05-06 PROCEDURE — 93005 ELECTROCARDIOGRAM TRACING: CPT

## 2022-05-06 PROCEDURE — 93971 EXTREMITY STUDY: CPT | Mod: LT

## 2022-05-06 PROCEDURE — 84443 ASSAY THYROID STIM HORMONE: CPT | Performed by: EMERGENCY MEDICINE

## 2022-05-06 PROCEDURE — 82550 ASSAY OF CK (CPK): CPT | Performed by: EMERGENCY MEDICINE

## 2022-05-06 PROCEDURE — 85610 PROTHROMBIN TIME: CPT | Performed by: EMERGENCY MEDICINE

## 2022-05-06 PROCEDURE — 258N000003 HC RX IP 258 OP 636: Performed by: EMERGENCY MEDICINE

## 2022-05-06 PROCEDURE — 96360 HYDRATION IV INFUSION INIT: CPT | Mod: XU

## 2022-05-06 RX ORDER — LISINOPRIL 20 MG/1
20 TABLET ORAL DAILY
Status: DISCONTINUED | OUTPATIENT
Start: 2022-05-07 | End: 2022-05-08 | Stop reason: HOSPADM

## 2022-05-06 RX ORDER — ACETAMINOPHEN 325 MG/1
650 TABLET ORAL EVERY 6 HOURS PRN
Status: DISCONTINUED | OUTPATIENT
Start: 2022-05-06 | End: 2022-05-08 | Stop reason: HOSPADM

## 2022-05-06 RX ORDER — DEXTROSE MONOHYDRATE 25 G/50ML
25-50 INJECTION, SOLUTION INTRAVENOUS
Status: DISCONTINUED | OUTPATIENT
Start: 2022-05-06 | End: 2022-05-08 | Stop reason: HOSPADM

## 2022-05-06 RX ORDER — ASPIRIN 325 MG
325 TABLET ORAL ONCE
Status: COMPLETED | OUTPATIENT
Start: 2022-05-06 | End: 2022-05-06

## 2022-05-06 RX ORDER — ONDANSETRON 4 MG/1
4 TABLET, ORALLY DISINTEGRATING ORAL EVERY 6 HOURS PRN
Status: DISCONTINUED | OUTPATIENT
Start: 2022-05-06 | End: 2022-05-08 | Stop reason: HOSPADM

## 2022-05-06 RX ORDER — GLYBURIDE 5 MG/1
5 TABLET ORAL
Status: DISCONTINUED | OUTPATIENT
Start: 2022-05-07 | End: 2022-05-08 | Stop reason: HOSPADM

## 2022-05-06 RX ORDER — SODIUM CHLORIDE 9 MG/ML
INJECTION, SOLUTION INTRAVENOUS CONTINUOUS
Status: DISCONTINUED | OUTPATIENT
Start: 2022-05-07 | End: 2022-05-07

## 2022-05-06 RX ORDER — ONDANSETRON 2 MG/ML
4 INJECTION INTRAMUSCULAR; INTRAVENOUS EVERY 6 HOURS PRN
Status: DISCONTINUED | OUTPATIENT
Start: 2022-05-06 | End: 2022-05-08 | Stop reason: HOSPADM

## 2022-05-06 RX ORDER — ACETAMINOPHEN 650 MG/1
650 SUPPOSITORY RECTAL EVERY 6 HOURS PRN
Status: DISCONTINUED | OUTPATIENT
Start: 2022-05-06 | End: 2022-05-08 | Stop reason: HOSPADM

## 2022-05-06 RX ORDER — VITAMIN B COMPLEX
25 TABLET ORAL DAILY
Status: DISCONTINUED | OUTPATIENT
Start: 2022-05-07 | End: 2022-05-08 | Stop reason: HOSPADM

## 2022-05-06 RX ORDER — NICOTINE POLACRILEX 4 MG
15-30 LOZENGE BUCCAL
Status: DISCONTINUED | OUTPATIENT
Start: 2022-05-06 | End: 2022-05-08 | Stop reason: HOSPADM

## 2022-05-06 RX ADMIN — ASPIRIN 325 MG ORAL TABLET 325 MG: 325 PILL ORAL at 21:09

## 2022-05-06 RX ADMIN — SODIUM CHLORIDE 1000 ML: 9 INJECTION, SOLUTION INTRAVENOUS at 18:41

## 2022-05-06 ASSESSMENT — ENCOUNTER SYMPTOMS
SHORTNESS OF BREATH: 0
BACK PAIN: 0
WEAKNESS: 1
APPETITE CHANGE: 0
ARTHRALGIAS: 1

## 2022-05-06 NOTE — ED PROVIDER NOTES
History   Chief Complaint:  Generalized Weakness       HPI   Johny Renee is a 77 year old male with history of type 2 diabetes and neuropathy who presents with generalized weakness and left hip pain.  He explains that for the past few weeks he has noticed muscle weakness but has not seen his primary physician for this. Notes less exercise recently. Today, he complains of difficulty standing from sitting and walking due to pain in his left hip area of sudden onset today, noting he required a walker to ambulate today, able to walk on own at baseline. Denies recent falls, chest pain, shortness of breath, changes to eating or drinking, back pain or right leg pain. Denies Covid-19 contacts but is not Covid-19 or influenza vaccinated. Of note, he consumes alcohol but denies use of tobacco. He reports he has not been checking his blood sugar or blood pressure at home but has been taking all prescribed medications.       Review of Systems   Constitutional: Negative for appetite change.   Respiratory: Negative for shortness of breath.    Cardiovascular: Negative for chest pain.   Musculoskeletal: Positive for arthralgias and gait problem. Negative for back pain.        + left hip pain   Neurological: Positive for weakness.   All other systems reviewed and are negative.        Allergies:  Simvastatin    Medications:  Diabeta  Hydrodiuril  Zestril  Glucophage    Past Medical History:     DMT2  HTN  Alcohol intoxication  HLD  CKD      Past Surgical History:    Disc surgery x2  ENT surgery  Vasectomy     Family History:    Father - parkinsonism  Mother - T2D  Brother - lung cancer    Social History:  Presents alone.   , lives with wife.     Physical Exam     Patient Vitals for the past 24 hrs:   BP Temp Temp src Pulse Resp SpO2   05/06/22 1750 (!) 152/79 98.8  F (37.1  C) Oral 91 20 97 %       Physical Exam  Constitutional:       Appearance: He is well-developed.   HENT:      Right Ear: External ear normal.       Left Ear: External ear normal.      Mouth/Throat:      Mouth: Mucous membranes are moist.      Pharynx: Oropharynx is clear. No oropharyngeal exudate.   Eyes:      General: No scleral icterus.     Extraocular Movements: Extraocular movements intact.      Conjunctiva/sclera: Conjunctivae normal.      Pupils: Pupils are equal, round, and reactive to light.   Neck:      Vascular: No JVD.   Cardiovascular:      Rate and Rhythm: Normal rate and regular rhythm.      Heart sounds: Normal heart sounds. No murmur heard.    No friction rub. No gallop.   Pulmonary:      Effort: Pulmonary effort is normal. No respiratory distress.      Breath sounds: Normal breath sounds. No wheezing or rales.   Abdominal:      General: Bowel sounds are normal. There is no distension.      Palpations: Abdomen is soft. There is no mass.      Tenderness: There is no abdominal tenderness.   Musculoskeletal:         General: Tenderness present. Normal range of motion.      Cervical back: Normal range of motion and neck supple.      Comments: Left upper outer thigh with an area of tenderness but without swelling or redness. No back pain. R leg normal without pain. Pulses normal in BLE. Baseline neuropathy in BLE. No L leg swelling noted.   Skin:     General: Skin is warm and dry.      Capillary Refill: Capillary refill takes less than 2 seconds.      Findings: No rash.   Neurological:      General: No focal deficit present.      Mental Status: He is alert.      Cranial Nerves: No cranial nerve deficit.         Emergency Department Course   ECG  ECG obtained at 1754, ECG read at 1800  Sinus rhythm with frequent premature ventricular complexes  Possible inferior infarct, age undetermined  Abnormal ECG  Rate 93 bpm. MN interval 148 ms. QRS duration 84 ms. QT/QTc 346/430 ms. P-R-T axes 34 32 24.     Imaging:  CT Head w/o Contrast   Final Result   IMPRESSION:   1.  No acute intracranial process.   2.  Chronic intracranial changes described above.      US  Lower Extremity Venous Duplex Left   Final Result   IMPRESSION:   1.  No deep venous thrombosis in the left lower extremity.        Report per radiology    Laboratory:  Labs Ordered and Resulted from Time of ED Arrival to Time of ED Departure   BASIC METABOLIC PANEL - Abnormal       Result Value    Sodium 144      Potassium 4.4      Chloride 114 (*)     Carbon Dioxide (CO2) 18 (*)     Anion Gap 12      Urea Nitrogen 33 (*)     Creatinine 1.86 (*)     Calcium 8.9      Glucose 91      GFR Estimate 36 (*)    CBC WITH PLATELETS AND DIFFERENTIAL - Abnormal    WBC Count 7.4      RBC Count 4.12 (*)     Hemoglobin 12.6 (*)     Hematocrit 39.0 (*)     MCV 95      MCH 30.6      MCHC 32.3      RDW 12.6      Platelet Count 202      % Neutrophils 75      % Lymphocytes 10      % Monocytes 12      % Eosinophils 1      % Basophils 1      % Immature Granulocytes 1      NRBCs per 100 WBC 0      Absolute Neutrophils 5.6      Absolute Lymphocytes 0.7 (*)     Absolute Monocytes 0.9      Absolute Eosinophils 0.1      Absolute Basophils 0.0      Absolute Immature Granulocytes 0.1      Absolute NRBCs 0.0     COMPREHENSIVE METABOLIC PANEL - Abnormal    Sodium 144      Potassium 4.4      Chloride 116 (*)     Carbon Dioxide (CO2) 18 (*)     Anion Gap 10      Urea Nitrogen 32 (*)     Creatinine 1.89 (*)     Calcium 8.9      Glucose 91      Alkaline Phosphatase 47      AST 30      ALT 37      Protein Total 7.0      Albumin 3.6      Bilirubin Total 0.3      GFR Estimate 36 (*)    TROPONIN I - Abnormal    Troponin I High Sensitivity 137 (*)    CBC WITH PLATELETS AND DIFFERENTIAL - Abnormal    WBC Count 7.0      RBC Count 3.81 (*)     Hemoglobin 11.8 (*)     Hematocrit 35.3 (*)     MCV 93      MCH 31.0      MCHC 33.4      RDW 12.5      Platelet Count 190      % Neutrophils 79      % Lymphocytes 7      % Monocytes 12      % Eosinophils 1      % Basophils 1      % Immature Granulocytes 0      NRBCs per 100 WBC 0      Absolute Neutrophils 5.5       Absolute Lymphocytes 0.5 (*)     Absolute Monocytes 0.9      Absolute Eosinophils 0.1      Absolute Basophils 0.0      Absolute Immature Granulocytes 0.0      Absolute NRBCs 0.0     INR - Normal    INR 1.13     LACTIC ACID WHOLE BLOOD - Normal    Lactic Acid 1.7     MAGNESIUM - Normal    Magnesium 1.8     TSH WITH FREE T4 REFLEX - Normal    TSH 1.49     CK TOTAL - Normal         ROUTINE UA WITH MICROSCOPIC REFLEX TO CULTURE   COVID-19 VIRUS (CORONAVIRUS) BY PCR   ETHYL ALCOHOL LEVEL          Emergency Department Course:       Reviewed:  I reviewed nursing notes, vitals, past medical history and Care Everywhere    Assessments:  1810 I obtained history and examined the patient as noted above.   2100 I rechecked the patient and explained findings.     Consults:  2145 I consulted with Dr. Palomo, hospitalist, who accepted the patient.     Interventions:  1841 NS 1L IV  2109 Asa 325 mg PO    Disposition:  The patient was admitted to the hospital under the care of Dr. Palomo, hospitalist.     Impression & Plan     Medical Decision Making:  Patient presents today for evaluation of difficulty walking due to left leg pain.  His left leg does not have any swelling or redness.  There is no evidence of fall to suggest trauma.  He points area of a left upper outer thigh that is painful.  There is no back pain or pain down his gluteal region to suggest radiculopathy.  Evaluation did show some mild dehydration given his elevated BUN and creatinine.  Ultrasound did not show anything acute.  Interestingly, his troponin came back quite elevated.  Patient tells me he has not had chest pain or shortness of breath.  He is also a poor historian and is unable to tell me when he has had chest pain.  He did mention some chest pain in the past.  He never sought evaluation for that.  Given his elevated troponin and difficulty with ambulation, patient requires admission.  He was given a dose of aspirin here.  Patient admitted to  observation under Dr. Burch.    Diagnosis:    ICD-10-CM    1. Generalized muscle weakness  M62.81    2. Elevated troponin  R77.8    3. Dehydration  E86.0        Scribe Disclosure:  I, Katarina Pyle, am serving as a scribe at 6:03 PM on 5/6/2022 to document services personally performed by  Anirudh Luna MD based on my observations and the provider's statements to me.              Anirudh Luna MD  05/06/22 1501

## 2022-05-06 NOTE — ED TRIAGE NOTES
Weakness for the last several weeks. Today was much more weaker than normal. Can barely walk with a walker, which he doesn't normally need. A&Ox4.

## 2022-05-07 LAB
ALBUMIN UR-MCNC: 20 MG/DL
ANION GAP SERPL CALCULATED.3IONS-SCNC: 5 MMOL/L (ref 3–14)
APPEARANCE UR: CLEAR
BASOPHILS # BLD AUTO: 0 10E3/UL (ref 0–0.2)
BASOPHILS NFR BLD AUTO: 0 %
BILIRUB UR QL STRIP: NEGATIVE
BUN SERPL-MCNC: 27 MG/DL (ref 7–30)
CALCIUM SERPL-MCNC: 8.3 MG/DL (ref 8.5–10.1)
CHLORIDE BLD-SCNC: 114 MMOL/L (ref 94–109)
CO2 SERPL-SCNC: 24 MMOL/L (ref 20–32)
COLOR UR AUTO: ABNORMAL
CREAT SERPL-MCNC: 1.64 MG/DL (ref 0.66–1.25)
D DIMER PPP FEU-MCNC: 0.82 UG/ML FEU (ref 0–0.5)
EOSINOPHIL # BLD AUTO: 0 10E3/UL (ref 0–0.7)
EOSINOPHIL NFR BLD AUTO: 1 %
ERYTHROCYTE [DISTWIDTH] IN BLOOD BY AUTOMATED COUNT: 12.5 % (ref 10–15)
ETHANOL SERPL-MCNC: <0.1 G/DL
GFR SERPL CREATININE-BSD FRML MDRD: 42 ML/MIN/1.73M2
GLUCOSE BLD-MCNC: 89 MG/DL (ref 70–99)
GLUCOSE BLDC GLUCOMTR-MCNC: 103 MG/DL (ref 70–99)
GLUCOSE BLDC GLUCOMTR-MCNC: 131 MG/DL (ref 70–99)
GLUCOSE BLDC GLUCOMTR-MCNC: 152 MG/DL (ref 70–99)
GLUCOSE BLDC GLUCOMTR-MCNC: 163 MG/DL (ref 70–99)
GLUCOSE BLDC GLUCOMTR-MCNC: 182 MG/DL (ref 70–99)
GLUCOSE BLDC GLUCOMTR-MCNC: 63 MG/DL (ref 70–99)
GLUCOSE BLDC GLUCOMTR-MCNC: 79 MG/DL (ref 70–99)
GLUCOSE BLDC GLUCOMTR-MCNC: 85 MG/DL (ref 70–99)
GLUCOSE BLDC GLUCOMTR-MCNC: 93 MG/DL (ref 70–99)
GLUCOSE BLDC GLUCOMTR-MCNC: 95 MG/DL (ref 70–99)
GLUCOSE UR STRIP-MCNC: 30 MG/DL
HCT VFR BLD AUTO: 37.6 % (ref 40–53)
HGB BLD-MCNC: 12 G/DL (ref 13.3–17.7)
HGB UR QL STRIP: ABNORMAL
IMM GRANULOCYTES # BLD: 0 10E3/UL
IMM GRANULOCYTES NFR BLD: 1 %
KETONES UR STRIP-MCNC: NEGATIVE MG/DL
LEUKOCYTE ESTERASE UR QL STRIP: NEGATIVE
LYMPHOCYTES # BLD AUTO: 1 10E3/UL (ref 0.8–5.3)
LYMPHOCYTES NFR BLD AUTO: 16 %
MCH RBC QN AUTO: 30.3 PG (ref 26.5–33)
MCHC RBC AUTO-ENTMCNC: 31.9 G/DL (ref 31.5–36.5)
MCV RBC AUTO: 95 FL (ref 78–100)
MONOCYTES # BLD AUTO: 1 10E3/UL (ref 0–1.3)
MONOCYTES NFR BLD AUTO: 15 %
MUCOUS THREADS #/AREA URNS LPF: PRESENT /LPF
NEUTROPHILS # BLD AUTO: 4.4 10E3/UL (ref 1.6–8.3)
NEUTROPHILS NFR BLD AUTO: 67 %
NITRATE UR QL: NEGATIVE
NRBC # BLD AUTO: 0 10E3/UL
NRBC BLD AUTO-RTO: 0 /100
PH UR STRIP: 5 [PH] (ref 5–7)
PLATELET # BLD AUTO: 189 10E3/UL (ref 150–450)
POTASSIUM BLD-SCNC: 4.1 MMOL/L (ref 3.4–5.3)
RBC # BLD AUTO: 3.96 10E6/UL (ref 4.4–5.9)
RBC URINE: <1 /HPF
SODIUM SERPL-SCNC: 143 MMOL/L (ref 133–144)
SP GR UR STRIP: 1.02 (ref 1–1.03)
UROBILINOGEN UR STRIP-MCNC: NORMAL MG/DL
WBC # BLD AUTO: 6.5 10E3/UL (ref 4–11)
WBC URINE: <1 /HPF

## 2022-05-07 PROCEDURE — 96361 HYDRATE IV INFUSION ADD-ON: CPT

## 2022-05-07 PROCEDURE — 85379 FIBRIN DEGRADATION QUANT: CPT | Performed by: HOSPITALIST

## 2022-05-07 PROCEDURE — 999N000147 HC STATISTIC PT IP EVAL DEFER

## 2022-05-07 PROCEDURE — 96372 THER/PROPH/DIAG INJ SC/IM: CPT | Performed by: HOSPITALIST

## 2022-05-07 PROCEDURE — 258N000003 HC RX IP 258 OP 636: Performed by: INTERNAL MEDICINE

## 2022-05-07 PROCEDURE — 36415 COLL VENOUS BLD VENIPUNCTURE: CPT | Performed by: INTERNAL MEDICINE

## 2022-05-07 PROCEDURE — 82962 GLUCOSE BLOOD TEST: CPT | Mod: 91

## 2022-05-07 PROCEDURE — 99225 PR SUBSEQUENT OBSERVATION CARE,LEVEL II: CPT | Performed by: HOSPITALIST

## 2022-05-07 PROCEDURE — 250N000013 HC RX MED GY IP 250 OP 250 PS 637: Performed by: HOSPITALIST

## 2022-05-07 PROCEDURE — 81001 URINALYSIS AUTO W/SCOPE: CPT | Performed by: EMERGENCY MEDICINE

## 2022-05-07 PROCEDURE — 36415 COLL VENOUS BLD VENIPUNCTURE: CPT | Performed by: HOSPITALIST

## 2022-05-07 PROCEDURE — 80048 BASIC METABOLIC PNL TOTAL CA: CPT | Performed by: INTERNAL MEDICINE

## 2022-05-07 PROCEDURE — 250N000013 HC RX MED GY IP 250 OP 250 PS 637: Performed by: INTERNAL MEDICINE

## 2022-05-07 PROCEDURE — 85004 AUTOMATED DIFF WBC COUNT: CPT | Performed by: INTERNAL MEDICINE

## 2022-05-07 PROCEDURE — G0378 HOSPITAL OBSERVATION PER HR: HCPCS

## 2022-05-07 PROCEDURE — 93010 ELECTROCARDIOGRAM REPORT: CPT | Performed by: INTERNAL MEDICINE

## 2022-05-07 PROCEDURE — 250N000011 HC RX IP 250 OP 636: Performed by: HOSPITALIST

## 2022-05-07 RX ORDER — CARBIDOPA AND LEVODOPA 25; 100 MG/1; MG/1
1 TABLET ORAL 3 TIMES DAILY
Status: DISCONTINUED | OUTPATIENT
Start: 2022-05-07 | End: 2022-05-08 | Stop reason: HOSPADM

## 2022-05-07 RX ORDER — ENOXAPARIN SODIUM 100 MG/ML
40 INJECTION SUBCUTANEOUS EVERY 24 HOURS
Status: DISCONTINUED | OUTPATIENT
Start: 2022-05-07 | End: 2022-05-08 | Stop reason: HOSPADM

## 2022-05-07 RX ADMIN — ENOXAPARIN SODIUM 40 MG: 40 INJECTION SUBCUTANEOUS at 12:57

## 2022-05-07 RX ADMIN — ACETAMINOPHEN 650 MG: 325 TABLET, FILM COATED ORAL at 21:02

## 2022-05-07 RX ADMIN — CARBIDOPA AND LEVODOPA 1 TABLET: 25; 100 TABLET ORAL at 09:53

## 2022-05-07 RX ADMIN — SODIUM CHLORIDE: 9 INJECTION, SOLUTION INTRAVENOUS at 00:28

## 2022-05-07 RX ADMIN — ASPIRIN 325 MG: 325 TABLET, COATED ORAL at 12:57

## 2022-05-07 RX ADMIN — Medication 25 MCG: at 09:53

## 2022-05-07 NOTE — PLAN OF CARE
Eval orders received, chart reviewed. Per discussion in rounds, pt currently fall risk, requires A x 2 for mobility. Pt will require TCU stay at discharge to improve functional mobility as pt far below baseline at this time. Will defer PT evaluation to next level of care.

## 2022-05-07 NOTE — H&P
[facility]    History and Physical  Hospitalist       Date of Admission:  5/6/2022  Date of Service (when I saw the patient): 05/06/22    Assessment & Plan   Johny Renee is a 77 year old male patient with past medical history of diabetes mellitus, hypertension who came to emergency with a complaint of generalized weakness and left thigh pain.  He states that his been feeling generally weak for the last few weeks.  He stated that he was having some left thigh pain and was unable to ambulate.  He denies falls.  He has no nausea or vomiting.  He stated that he he has not been drinking alcohol the past few days.  He denies cough, shortness of breath, chest pain, dysuria, urgency or frequency.  He denies noncompliance with his medications.  Vital signs were stable in emergency room.  Lab work-up showed sodium 144, potassium 4.4, creatinine 1.89, troponin 137, TSH 1.49.  CK total 162.  COVID-19 PCR positive.  CT of the head without contrast is negative for acute abnormality.  In emergency room, he was given IV fluids. Urinalysis is negative. Ultrasound of left thigh is negative for DVT.    Generalized weakness, likely due to COVID-19 infection  Patient does have generalized weakness for the past few weeks.  He has no fever, cough, shortness of breath.  Denies nausea or vomiting.  -Currently his oxygen saturation is normal.  Will monitor vital signs.  We will give him IV fluid.  CK total normal.  COVID-19 PCR positive.    COVID-19 infection  Not vaccinated for Covid   COVID-19 PCR positive.  Currently is not hypoxic.  We will continue to monitor.  No need for dexamethasone or remdesivir at this time.    Diabetes mellitus type 2, not on insulin treatment  Will continue his PTA medications.  We will put him on insulin sliding scale.  Will monitor blood sugar    Hypertension, not on antihypertensive medications  Blood pressure slightly on the higher side.  Will monitor blood pressure.    We will  admit patient to observation unit  DVT Prophylaxis: Pneumatic Compression Devices  Code Status: Full Code    Disposition: Expected discharge in 1-2 days    Elle Palomo MD    Primary Care Physician   Kenneth G. Pallas    Chief Complaint   Generalized weakness    History is obtained from the patient    History of Present Illness   Johny Renee is a 77 year old male patient with past medical history of diabetes mellitus, hypertension who came to emergency with a complaint of generalized weakness and left thigh pain.  He states that his been feeling generally weak for the last few weeks.  He stated that he was having some left thigh pain and was unable to ambulate.  He denies falls.  He has no nausea or vomiting.  He stated that he he has not been drinking alcohol the past few days.  He denies cough, shortness of breath, chest pain, dysuria, urgency or frequency.  He denies noncompliance with his medications.  On arrival to emergency room, his vital signs were checked and showed temperature 98.8, pulse 91, blood pressure 152/79, oxygen saturation 97% on room air.  Laboratory work-up showed sodium 144, potassium 4.4, creatinine 1.89, troponin 137, TSH 1.49.  CK total 162.  In emergency room, he was given IV fluids. Urinalysis is negative. Ultrasound of left thigh is negative for DVT.  He was admitted to observation unit.      Past Medical History    I have reviewed this patient's medical history and updated it with pertinent information if needed.   Past Medical History:   Diagnosis Date     Diabetes mellitus (H)      Hypertension        Past Surgical History   I have reviewed this patient's surgical history and updated it with pertinent information if needed.  Past Surgical History:   Procedure Laterality Date     BACK SURGERY      disc surgery x2     ENT SURGERY      nasal polyp removal several times     GENITOURINARY SURGERY      vasectomy       Prior to Admission Medications   Prior to Admission Medications    Prescriptions Last Dose Informant Patient Reported? Taking?   Vitamin D3 (CHOLECALCIFEROL) 25 mcg (1000 units) tablet 5/6/2022 at am  Yes Yes   Sig: Take 1 tablet (25 mcg) by mouth daily   glyBURIDE (DIABETA /MICRONASE) 5 MG tablet 5/6/2022 at am  No Yes   Sig: TAKE 1 TABLET (5 MG) BY MOUTH DAILY (WITH BREAKFAST)   lisinopril (ZESTRIL) 20 MG tablet 5/6/2022 at am  No Yes   Sig: TAKE 1 TABLET (20 MG) BY MOUTH DAILY   metFORMIN (GLUCOPHAGE) 1000 MG tablet 5/6/2022 at x1 am  Yes Yes   Sig: Take 1,000 mg by mouth 2 times daily (with meals)      Facility-Administered Medications: None     Allergies   Allergies   Allergen Reactions     Simvastatin Muscle Pain (Myalgia)       Social History   I have reviewed this patient's social history and updated it with pertinent information if needed. Johny Renee  reports that he has quit smoking. His smoking use included cigarettes. He has a 15.00 pack-year smoking history. He has never used smokeless tobacco. He reports current alcohol use. He reports that he does not use drugs.    Family History   I have reviewed this patient's family history and updated it with pertinent information if needed.   Family History   Problem Relation Age of Onset     Parkinsonism Father      Diabetes Type 2  Mother      Heart Disease Mother      Lung Cancer Brother        Review of Systems   The 10 point Review of Systems is negative other than noted in the HPI or here.     Physical Exam   Temp: 98.8  F (37.1  C) Temp src: Oral BP: 127/81 Pulse: 64   Resp: 18 SpO2: 97 %      Vital Signs with Ranges  Temp:  [98.8  F (37.1  C)] 98.8  F (37.1  C)  Pulse:  [64-91] 64  Resp:  [18-20] 18  BP: ()/(68-87) 127/81  SpO2:  [94 %-97 %] 97 %  0 lbs 0 oz    GEN:  Alert, oriented x 3, appears comfortable, NAD.  HEENT:  Normocephalic/atraumatic, no scleral icterus, no nasal discharge, mouth moist.  CV:  Regular rate and rhythm, no murmur or JVD.  S1 + S2 noted, no S3 or S4.  LUNGS:  Clear to auscultation  bilaterally without rales/rhonchi/wheezing/retractions.  Symmetric chest rise on inhalation noted.  ABD:  Active bowel sounds, soft, non-tender/non-distended.  No rebound/guarding/rigidity.  EXT:  No edema or cyanosis.  Hands/feet warm to touch with good signs of peripheral perfusion.  No joint synovitis noted.  SKIN:  Dry to touch, no exanthems noted in the visualized areas.  NEURO:  Symmetric muscle strength, sensation to touch grossly intact.  No new focal deficits appreciated.    Data   Data reviewed today:  I personally reviewed   Recent Labs   Lab 05/06/22  1840 05/06/22  1752   WBC 7.0 7.4   HGB 11.8* 12.6*   MCV 93 95    202   INR  --  1.13   NA  --  144  144   POTASSIUM  --  4.4  4.4   CHLORIDE  --  116*  114*   CO2  --  18*  18*   BUN  --  32*  33*   CR  --  1.89*  1.86*   ANIONGAP  --  10  12   TORY  --  8.9  8.9   GLC  --  91  91   ALBUMIN  --  3.6   PROTTOTAL  --  7.0   BILITOTAL  --  0.3   ALKPHOS  --  47   ALT  --  37   AST  --  30       Recent Results (from the past 24 hour(s))   US Lower Extremity Venous Duplex Left    Narrative    EXAM: US LOWER EXTREMITY VENOUS DUPLEX LEFT  LOCATION: [facility]  DATE/TIME: 5/6/2022 7:55 PM    INDICATION: Left lower extremity pain, swelling and edema.  COMPARISON: None.  TECHNIQUE: Venous Duplex ultrasound of the left lower extremity with and without compression, augmentation and duplex. Color flow and spectral Doppler with waveform analysis performed.    FINDINGS: Exam includes the common femoral, femoral, popliteal, and contralateral common femoral veins as well as segmentally visualized deep calf veins and greater saphenous vein.     LEFT: No deep vein thrombosis. No superficial thrombophlebitis. No popliteal cyst.      Impression    IMPRESSION:  1.  No deep venous thrombosis in the left lower extremity.   CT Head w/o Contrast    Narrative    EXAM: CT HEAD W/O CONTRAST  LOCATION: [facility]  DATE/TIME:  5/6/2022 8:09 PM    INDICATION: Mental status change, unknown cause  COMPARISON: None.  TECHNIQUE: Routine CT Head without IV contrast. Multiplanar reformats. Dose reduction techniques were used.    FINDINGS:  INTRACRANIAL CONTENTS: No intracranial hemorrhage, extraaxial collection, or mass effect.  No CT evidence of acute infarct. Severe presumed chronic small vessel ischemic changes. Moderate to severe ventriculomegaly with ventricles enlarged   disproportionate to the sulcal atrophy. Findings may reflect normal pressure hydrocephalus, central brain atrophy, or extraventricular noncommunicating hydrocephalus. Please correlate clinically.     VISUALIZED ORBITS/SINUSES/MASTOIDS: No intraorbital abnormality. Sequelae functional endoscopic sinus surgery. Multiple small nasal polyps. Multiple paranasal sinus retention cysts/mucosal polyps. Right frontal sinus extensively opacified. Left frontal   sinus moderately opacified. Anterior and mid ethmoid air cells severely opacified. Right sphenoid sinus retention cyst/mucosal polyp. Bilateral maxillary sinuses mild mucosal thickening. Areas of increased attenuation may reflect inspissated secretions   and/or fungal colonization. Left mastoid air cells mild patchy opacification. Right mastoid cells essentially clear.    BONES/SOFT TISSUES: No acute abnormality. Vascular calcifications.      Impression    IMPRESSION:  1.  No acute intracranial process.  2.  Chronic intracranial changes described above.

## 2022-05-07 NOTE — PLAN OF CARE
PRIMARY DIAGNOSIS: GENERALIZED WEAKNESS    OUTPATIENT/OBSERVATION GOALS TO BE MET BEFORE DISCHARGE  1. Orthostatic performed: No    2. Tolerating PO medications: Yes    3. Return to near baseline physical activity: Yes    4. Cleared for discharge by consultants (if involved): {Yes) No Pt is alert and oriented x4. Pt denies pain or discomfort. Pt is positive for COVID19 and is on isolation. Pt is assist of one in transfer and cares. Pt blood sugar upon admitting to observation unit was 63. Pt was given apple juice and sandwich. Pt blood sugar was rechecked which was 79.  MD notified. Pt is on IVF NS @ 75ml/hr. Pt is on moderate consistent carb Pt is on cardiac monitoring. Pt is sleeping in bed. Bed alarm in place. Call light is within reach. Will continue to monitor and assess pt.   Discharge Planner Nurse   Safe discharge environment identified: Yes  Barriers to discharge: Yes       Entered by: Ponce Petersen RN 05/07/2022 5:04 AM    Pt is alert and oriented x4. Pt denies pain or discomfort. Pt is positive for COVID19 and is on isolation. Pt is assist of one in transfer and cares. Pt blood sugar upon admitting to observation unit was 63. Pt was given apple juice and sandwich. Pt blood sugar was rechecked which was 79  MD notified. MSPt is on IVF NS @ 75ml/hr. Pt is on moderate consistent carb Pt is on cardiac monitoring. Pt is sleeping in bed. Bed alarm in place. Call light is within reach. Will continue to monitor and assess pt.   Please review provider order for any additional goals.   Nurse to notify provider when observation goals have been met and patient is ready for discharge.

## 2022-05-07 NOTE — PLAN OF CARE
"PRIMARY DIAGNOSIS: GENERALIZED WEAKNESS R/T COVID    OUTPATIENT/OBSERVATION GOALS TO BE MET BEFORE DISCHARGE  1. Orthostatic performed: No    2. Tolerating PO medications: Yes    3. Return to near baseline physical activity: No    4. Cleared for discharge by consultants (if involved): No - PT to see    Discharge Planner Nurse   Safe discharge environment identified: No  Barriers to discharge: Yes - profound weakness       Entered by: Brenda Stearns RN 05/07/2022      Vitals: /82  Pulse 82  Temp 98  F  Resp 15  SpO2 95% on RA       Summary: VSS. See note regarding fall this AM. Patient alert and oriented, but forgetful. Requires reinforcement of POC often. Agitated at times; mood labile. Agreeable to AM medications, refused PM Sinemet - \"They're chasing Parkinson's and I don't have Parkinson's\". Agreeable to Lovenox after significant amount of discussion. ASA given for BLE achiness. Now SL - tolerating regular diet. Patient reports occasionally coughing up 'a little phlegm' - no cough witnessed. LS clear in all fields. Up with Ax1-2 w/ GB and walker. COVID precautions maintained. Tele in place; SR w/ freq PACs & ST depression per tele tech.     Plan: Hold diabetic agents, BP meds. PT to see. SW following. Bed alarm on.     Please review provider order for any additional goals.   Nurse to notify provider when observation goals have been met and patient is ready for discharge.  "

## 2022-05-07 NOTE — PROVIDER NOTIFICATION
Notified by NST of patient fall. Bed alarmed, staff donning PPE and patient found on ground. States he did not fall, just 'slid out of bed'. MD at bedside, assessed patient. Assisted back to bed with Ax3.

## 2022-05-07 NOTE — ED NOTES
[facility]  ED Nurse Handoff Report    Johny Renee is a 77 year old male   ED Chief complaint: Generalized Weakness  . ED Diagnosis:   Final diagnoses:   Generalized muscle weakness   Elevated troponin   Dehydration     Allergies:   Allergies   Allergen Reactions     Simvastatin Muscle Pain (Myalgia)       Code Status: Full Code  Activity level - Baseline/Home:  Stand by Assist. Activity Level - Current:   Assist X 1. Lift room needed: No. Bariatric: No   Needed: No   Isolation: No. Infection: Not Applicable  COVID r/o and special precautions.     Vital Signs:   Vitals:    05/06/22 1750   BP: (!) 152/79   Pulse: 91   Resp: 20   Temp: 98.8  F (37.1  C)   TempSrc: Oral   SpO2: 97%       Cardiac Rhythm:  ,      Pain level:    Patient confused: No. Patient Falls Risk: Yes.   Elimination Status: Has voided   Patient Report - Initial Complaint: Weakness, difficulty ambulating. Focused Assessment: Generalized weakness, impaired gait   Tests Performed:   Labs Ordered and Resulted from Time of ED Arrival to Time of ED Departure   BASIC METABOLIC PANEL - Abnormal       Result Value    Sodium 144      Potassium 4.4      Chloride 114 (*)     Carbon Dioxide (CO2) 18 (*)     Anion Gap 12      Urea Nitrogen 33 (*)     Creatinine 1.86 (*)     Calcium 8.9      Glucose 91      GFR Estimate 36 (*)    CBC WITH PLATELETS AND DIFFERENTIAL - Abnormal    WBC Count 7.4      RBC Count 4.12 (*)     Hemoglobin 12.6 (*)     Hematocrit 39.0 (*)     MCV 95      MCH 30.6      MCHC 32.3      RDW 12.6      Platelet Count 202      % Neutrophils 75      % Lymphocytes 10      % Monocytes 12      % Eosinophils 1      % Basophils 1      % Immature Granulocytes 1      NRBCs per 100 WBC 0      Absolute Neutrophils 5.6      Absolute Lymphocytes 0.7 (*)     Absolute Monocytes 0.9      Absolute Eosinophils 0.1      Absolute Basophils 0.0      Absolute Immature Granulocytes 0.1      Absolute NRBCs 0.0     COMPREHENSIVE METABOLIC  PANEL - Abnormal    Sodium 144      Potassium 4.4      Chloride 116 (*)     Carbon Dioxide (CO2) 18 (*)     Anion Gap 10      Urea Nitrogen 32 (*)     Creatinine 1.89 (*)     Calcium 8.9      Glucose 91      Alkaline Phosphatase 47      AST 30      ALT 37      Protein Total 7.0      Albumin 3.6      Bilirubin Total 0.3      GFR Estimate 36 (*)    TROPONIN I - Abnormal    Troponin I High Sensitivity 137 (*)    CBC WITH PLATELETS AND DIFFERENTIAL - Abnormal    WBC Count 7.0      RBC Count 3.81 (*)     Hemoglobin 11.8 (*)     Hematocrit 35.3 (*)     MCV 93      MCH 31.0      MCHC 33.4      RDW 12.5      Platelet Count 190      % Neutrophils 79      % Lymphocytes 7      % Monocytes 12      % Eosinophils 1      % Basophils 1      % Immature Granulocytes 0      NRBCs per 100 WBC 0      Absolute Neutrophils 5.5      Absolute Lymphocytes 0.5 (*)     Absolute Monocytes 0.9      Absolute Eosinophils 0.1      Absolute Basophils 0.0      Absolute Immature Granulocytes 0.0      Absolute NRBCs 0.0     INR - Normal    INR 1.13     LACTIC ACID WHOLE BLOOD - Normal    Lactic Acid 1.7     MAGNESIUM - Normal    Magnesium 1.8     TSH WITH FREE T4 REFLEX - Normal    TSH 1.49     CK TOTAL - Normal         ROUTINE UA WITH MICROSCOPIC REFLEX TO CULTURE   COVID-19 VIRUS (CORONAVIRUS) BY PCR   ETHYL ALCOHOL LEVEL     CT Head w/o Contrast   Final Result   IMPRESSION:   1.  No acute intracranial process.   2.  Chronic intracranial changes described above.      US Lower Extremity Venous Duplex Left   Final Result   IMPRESSION:   1.  No deep venous thrombosis in the left lower extremity.        . Abnormal Results: See above.   Treatments provided: Diagnostics, asa  Family Comments: N/A  OBS brochure/video discussed/provided to patient:  Yes  ED Medications:   Medications   0.9% sodium chloride BOLUS (1,000 mLs Intravenous New Bag 5/6/22 1841)   aspirin (ASA) tablet 325 mg (325 mg Oral Given 5/6/22 2109)     Drips infusing:  No  For the  majority of the shift, the patient's behavior Green. Interventions performed were N/A.    Sepsis treatment initiated: No     Patient tested for COVID 19 prior to admission: YES    ED Nurse Name/Phone Number: Yogi Lane RN,   9:51 PM    RECEIVING UNIT ED HANDOFF REVIEW    Above ED Nurse Handoff Report was reviewed: Yes  Reviewed by: Ponce Petersen RN on May 6, 2022 at 11:18 PM

## 2022-05-07 NOTE — ED NOTES
DATE:  5/6/2022   TIME OF RECEIPT FROM LAB:  2300  LAB TEST:  Covid + & Trop +   RESULTS GIVEN WITH READ-BACK TO (PROVIDER):  KRISHNA Luna MD  TIME LAB VALUE REPORTED TO PROVIDER:  2300

## 2022-05-07 NOTE — PLAN OF CARE
ROOM # 230    Living Situation (if not independent, order SW consult): lives with wife at home  Facility name:  : [facility]    Activity level at baseline: Assist 1  Activity level on admit: Assist 2    Who will be transporting you at discharge: [facility]    Patient registered to observation; given Patient Bill of Rights; given the opportunity to ask questions about observation status and their plan of care.  Patient has been oriented to the observation room, bathroom and call light is in place.    Discussed discharge goals and expectations with patient/family.

## 2022-05-07 NOTE — PLAN OF CARE
"PRIMARY DIAGNOSIS: GENERALIZED WEAKNESS    OUTPATIENT/OBSERVATION GOALS TO BE MET BEFORE DISCHARGE  1. Orthostatic performed: No    2. Tolerating PO medications: Yes    3. Return to near baseline physical activity: Yes    4. Cleared for discharge by consultants (if involved): No    Discharge Planner Nurse   Safe discharge environment identified: Yes  Barriers to discharge: Yes       Entered by: Ponce Petersen RN 05/07/2022 1:46 AM    /78 (BP Location: Right arm)   Pulse 82   Temp 98  F (36.7  C) (Oral)   Resp 18   Ht 1.778 m (5' 10\")   Wt 96.3 kg (212 lb 6.4 oz)   SpO2 98%   BMI 30.48 kg/m        Please review provider order for any additional goals.   Nurse to notify provider when observation goals have been met and patient is ready for discharge.  "

## 2022-05-07 NOTE — PLAN OF CARE
Received critical call from [employer] that pt is having ST depression. Pt VSS. Hospitalist paged and she ordered EKG STAT. Will continue to monitor and assess pt.

## 2022-05-07 NOTE — PROGRESS NOTES
[facility]    Medicine Progress Note - Hospitalist Service    Date of Admission:  5/6/2022    Assessment & Plan          Johny Renee is a 77 year old male patient with past medical history of diabetes mellitus, hypertension, Parkinson's Disease (untreated). Admitted on 5/6/2022 with weakness and found to be Covid +    Weakness due to Covid 19 infection    - patient's respiratory status is stable, not needing oxygen    - has some cough    - has weakness/inability to walk due to underlying/untreated Parkinson's disease and now covid    - PT will see on Monday    Fall    - patient had a fall in the hospital this morning    - states the staff did not come in fast enough when he needed to go to the bathroom    - no injury (he slide to the floor from the bed and is actually adamantly stating it was not a fall)    - no imaging needed    Parkinson's Disease    - this was diagnosed a couple years ago during a hospital admission    - he was sent home with Sinemet    - daughter states patient refuses to believe he has Parkinson's and refuses to take his meds    - I started Sinemet    HTN    - will hold his home lisinopril as he has had some lower BS here    DM    - on metformin and glyburide    - BS has been stable    - holding meds    - last HbA1c 7.0 2/16/2022    Called and updated his daughter     Diet: Moderate Consistent Carb (60 g CHO per Meal) Diet    DVT Prophylaxis: Enoxaparin (Lovenox) SQ  Read Catheter: Not present  Central Lines: None  Cardiac Monitoring: None  Code Status: Full Code      Disposition Plan   Expected Discharge: 05/08/2022     Anticipated discharge location:  Awaiting care coordination huddle  Delays:       The patient's care was discussed with the Bedside Nurse, Patient and Patient's Family.    John Lizarraga MD  Hospitalist Service  [facility]  Securely message with the Vocera Web Console (learn more here)  Text page via [employer] [facility]  "    Clinically Significant Risk Factors Present on Admission          # Hypocalcemia: Ca = 8.3 mg/dL (Ref range: 8.5 - 10.1 mg/dL) and/or iCa = N/A on admission, will replace as needed         # Obesity: Estimated body mass index is 30.48 kg/m  as calculated from the following:    Height as of this encounter: 1.778 m (5' 10\").    Weight as of this encounter: 96.3 kg (212 lb 6.4 oz).      ______________________________________________________________________    Interval History   Patient was seen multiple times today.  The first time was after a fall.  The patient states that the staff did not come fast enough to help him to the bathroom so he tried to get out of bed.  He said he slid to the floor.  He is yelling at me that he  did not have a fall.  He denied any injury.  No pain.  I again saw the patient later to ask him about his history and tell him the plan.  He will use eating and drinking normally.  He stated he did not have Parkinson's disease.  No new complaints.    Data reviewed today: I reviewed all medications, new labs and imaging results over the last 24 hours. I personally reviewed no images or EKG's today.    Physical Exam   Vital Signs: Temp: 98.5  F (36.9  C) Temp src: Axillary BP: 103/73 Pulse: 91   Resp: 15 SpO2: 98 % O2 Device: None (Room air)    Weight: 212 lbs 6.4 oz  Constitutional: awake, alert, cooperative, no apparent distress, and appears stated age  Eyes: Lids and lashes normal, pupils equal, round and reactive to light, extra ocular muscles intact, sclera clear, conjunctiva normal  ENT: Normocephalic, without obvious abnormality, atraumatic, sinuses nontender on palpation, external ears without lesions, oral pharynx with moist mucous membranes, tonsils without erythema or exudates, gums normal and good dentition.  Respiratory: No increased work of breathing, good air exchange, clear to auscultation bilaterally, no crackles or wheezing  Cardiovascular: Normal apical impulse, regular rate " and rhythm, normal S1 and S2, no S3 or S4, and no murmur noted  GI: No scars, normal bowel sounds, soft, non-distended, non-tender, no masses palpated, no hepatosplenomegally  Skin: no bruising or bleeding  Musculoskeletal: no lower extremity pitting edema present    Data   Recent Labs   Lab 05/07/22  0855 05/07/22  0817 05/07/22  0642 05/07/22  0027 05/06/22  1840 05/06/22  1752   WBC  --   --  6.5  --  7.0 7.4   HGB  --   --  12.0*  --  11.8* 12.6*   MCV  --   --  95  --  93 95   PLT  --   --  189  --  190 202   INR  --   --   --   --   --  1.13   NA  --   --  143  --   --  144  144   POTASSIUM  --   --  4.1  --   --  4.4  4.4   CHLORIDE  --   --  114*  --   --  116*  114*   CO2  --   --  24  --   --  18*  18*   BUN  --   --  27  --   --  32*  33*   CR  --   --  1.64*  --   --  1.89*  1.86*   ANIONGAP  --   --  5  --   --  10  12   TORY  --   --  8.3*  --   --  8.9  8.9   GLC 93 85 89   < >  --  91  91   ALBUMIN  --   --   --   --   --  3.6   PROTTOTAL  --   --   --   --   --  7.0   BILITOTAL  --   --   --   --   --  0.3   ALKPHOS  --   --   --   --   --  47   ALT  --   --   --   --   --  37   AST  --   --   --   --   --  30    < > = values in this interval not displayed.     Recent Results (from the past 24 hour(s))   US Lower Extremity Venous Duplex Left    Narrative    EXAM: US LOWER EXTREMITY VENOUS DUPLEX LEFT  LOCATION: [facility]  DATE/TIME: 5/6/2022 7:55 PM    INDICATION: Left lower extremity pain, swelling and edema.  COMPARISON: None.  TECHNIQUE: Venous Duplex ultrasound of the left lower extremity with and without compression, augmentation and duplex. Color flow and spectral Doppler with waveform analysis performed.    FINDINGS: Exam includes the common femoral, femoral, popliteal, and contralateral common femoral veins as well as segmentally visualized deep calf veins and greater saphenous vein.     LEFT: No deep vein thrombosis. No superficial thrombophlebitis. No  popliteal cyst.      Impression    IMPRESSION:  1.  No deep venous thrombosis in the left lower extremity.   CT Head w/o Contrast    Narrative    EXAM: CT HEAD W/O CONTRAST  LOCATION: [facility]  DATE/TIME: 5/6/2022 8:09 PM    INDICATION: Mental status change, unknown cause  COMPARISON: None.  TECHNIQUE: Routine CT Head without IV contrast. Multiplanar reformats. Dose reduction techniques were used.    FINDINGS:  INTRACRANIAL CONTENTS: No intracranial hemorrhage, extraaxial collection, or mass effect.  No CT evidence of acute infarct. Severe presumed chronic small vessel ischemic changes. Moderate to severe ventriculomegaly with ventricles enlarged   disproportionate to the sulcal atrophy. Findings may reflect normal pressure hydrocephalus, central brain atrophy, or extraventricular noncommunicating hydrocephalus. Please correlate clinically.     VISUALIZED ORBITS/SINUSES/MASTOIDS: No intraorbital abnormality. Sequelae functional endoscopic sinus surgery. Multiple small nasal polyps. Multiple paranasal sinus retention cysts/mucosal polyps. Right frontal sinus extensively opacified. Left frontal   sinus moderately opacified. Anterior and mid ethmoid air cells severely opacified. Right sphenoid sinus retention cyst/mucosal polyp. Bilateral maxillary sinuses mild mucosal thickening. Areas of increased attenuation may reflect inspissated secretions   and/or fungal colonization. Left mastoid air cells mild patchy opacification. Right mastoid cells essentially clear.    BONES/SOFT TISSUES: No acute abnormality. Vascular calcifications.      Impression    IMPRESSION:  1.  No acute intracranial process.  2.  Chronic intracranial changes described above.

## 2022-05-07 NOTE — PHARMACY-ADMISSION MEDICATION HISTORY
Admission medication history interview status for this patient is complete. See [facility] admission navigator for allergy information, prior to admission medications and immunization status.     Medication history interview done, indicate source(s): Patient  Medication history resources (including written lists, pill bottles, clinic record):pill bottle at home  Pharmacy: [id number] [address]    Changes made to PTA medication list:  Added: none  Changed: Metformin XR 500mg 2 tabs bid -> Metformin 1000mg IR BID  Reported as Not Taking: none  Removed: Hydrochlorothiazide 12.5mg    Actions taken by pharmacist (provider contacted, etc):called wife to verify Lisinopril dose     Additional medication history information:None    Medication reconciliation/reorder completed by provider prior to medication history?  No    Prior to Admission medications    Medication Sig Last Dose Taking? Auth Provider   glyBURIDE (DIABETA /MICRONASE) 5 MG tablet TAKE 1 TABLET (5 MG) BY MOUTH DAILY (WITH BREAKFAST) 5/6/2022 at am Yes León Hayden, DO   lisinopril (ZESTRIL) 20 MG tablet TAKE 1 TABLET (20 MG) BY MOUTH DAILY 5/6/2022 at am Yes León Hayden, DO   metFORMIN (GLUCOPHAGE) 1000 MG tablet Take 1,000 mg by mouth 2 times daily (with meals) 5/6/2022 at x1 am Yes Unknown, Entered By History   Vitamin D3 (CHOLECALCIFEROL) 25 mcg (1000 units) tablet Take 1 tablet (25 mcg) by mouth daily 5/6/2022 at am Yes Aubrie Syed PA-C

## 2022-05-07 NOTE — PLAN OF CARE
PRIMARY DIAGNOSIS: GENERALIZED WEAKNESS R/T COVID    OUTPATIENT/OBSERVATION GOALS TO BE MET BEFORE DISCHARGE  1. Orthostatic performed: No    2. Tolerating PO medications: Yes    3. Return to near baseline physical activity: No    4. Cleared for discharge by consultants (if involved): No - PT to see    Discharge Planner Nurse   Safe discharge environment identified: No  Barriers to discharge: Yes - profound weakness       Entered by: Brenda Stearns RN 05/07/2022      Please review provider order for any additional goals.   Nurse to notify provider when observation goals have been met and patient is ready for discharge.

## 2022-05-07 NOTE — PLAN OF CARE
"PRIMARY DIAGNOSIS: GENERALIZED WEAKNESS    OUTPATIENT/OBSERVATION GOALS TO BE MET BEFORE DISCHARGE  1. Orthostatic performed: No    2. Tolerating PO medications: Yes    3. Return to near baseline physical activity: No    4. Cleared for discharge by consultants (if involved): No    Discharge Planner Nurse   Safe discharge environment identified: Yes  Barriers to discharge: Yes       Entered by: Ponce Petersen RN 05/07/2022 1:34 AM    /78 (BP Location: Right arm)   Pulse 82   Temp 98  F (36.7  C) (Oral)   Resp 18   Ht 1.778 m (5' 10\")   Wt 96.3 kg (212 lb 6.4 oz)   SpO2 98%   BMI 30.48 kg/m    Pt is alert and oriented x4. Pt denies pain or discomfort. Pt is positive for COVID19 and is on isolation. Pt is assist of one in transfer and cares. Pt blood sugar upon admitting to observation unit was 63. Pt was given apple juice and sandwich. Pt blood sugar was rechecked which was 79.  MD notified. Pt is on IVF NS @ 75ml/hr. Pt is on moderate consistent carb Pt is on cardiac monitoring. Pt is sleeping in bed. Bed alarm in place. Call light is within reach. Will continue to monitor and assess pt.   Please review provider order for any additional goals.   Nurse to notify provider when observation goals have been met and patient is ready for discharge.  "

## 2022-05-08 ENCOUNTER — APPOINTMENT (OUTPATIENT)
Dept: PHYSICAL THERAPY | Facility: CLINIC | Age: 77
End: 2022-05-08
Attending: HOSPITALIST
Payer: MEDICARE

## 2022-05-08 VITALS
HEIGHT: 70 IN | OXYGEN SATURATION: 96 % | BODY MASS INDEX: 30.41 KG/M2 | TEMPERATURE: 99 F | HEART RATE: 70 BPM | WEIGHT: 212.4 LBS | SYSTOLIC BLOOD PRESSURE: 136 MMHG | DIASTOLIC BLOOD PRESSURE: 56 MMHG | RESPIRATION RATE: 16 BRPM

## 2022-05-08 LAB
ANION GAP SERPL CALCULATED.3IONS-SCNC: 5 MMOL/L (ref 3–14)
BASOPHILS # BLD AUTO: 0 10E3/UL (ref 0–0.2)
BASOPHILS NFR BLD AUTO: 1 %
BUN SERPL-MCNC: 24 MG/DL (ref 7–30)
CALCIUM SERPL-MCNC: 8.3 MG/DL (ref 8.5–10.1)
CHLORIDE BLD-SCNC: 113 MMOL/L (ref 94–109)
CO2 SERPL-SCNC: 22 MMOL/L (ref 20–32)
CREAT SERPL-MCNC: 1.53 MG/DL (ref 0.66–1.25)
CRP SERPL-MCNC: 9.4 MG/L (ref 0–8)
EOSINOPHIL # BLD AUTO: 0.1 10E3/UL (ref 0–0.7)
EOSINOPHIL NFR BLD AUTO: 1 %
ERYTHROCYTE [DISTWIDTH] IN BLOOD BY AUTOMATED COUNT: 12.7 % (ref 10–15)
ERYTHROCYTE [SEDIMENTATION RATE] IN BLOOD BY WESTERGREN METHOD: 7 MM/HR (ref 0–20)
GFR SERPL CREATININE-BSD FRML MDRD: 46 ML/MIN/1.73M2
GLUCOSE BLD-MCNC: 131 MG/DL (ref 70–99)
GLUCOSE BLDC GLUCOMTR-MCNC: 101 MG/DL (ref 70–99)
GLUCOSE BLDC GLUCOMTR-MCNC: 111 MG/DL (ref 70–99)
HCT VFR BLD AUTO: 39.1 % (ref 40–53)
HGB BLD-MCNC: 12.5 G/DL (ref 13.3–17.7)
IMM GRANULOCYTES # BLD: 0 10E3/UL
IMM GRANULOCYTES NFR BLD: 0 %
LYMPHOCYTES # BLD AUTO: 1.1 10E3/UL (ref 0.8–5.3)
LYMPHOCYTES NFR BLD AUTO: 22 %
MCH RBC QN AUTO: 30.5 PG (ref 26.5–33)
MCHC RBC AUTO-ENTMCNC: 32 G/DL (ref 31.5–36.5)
MCV RBC AUTO: 95 FL (ref 78–100)
MONOCYTES # BLD AUTO: 0.8 10E3/UL (ref 0–1.3)
MONOCYTES NFR BLD AUTO: 15 %
NEUTROPHILS # BLD AUTO: 3.2 10E3/UL (ref 1.6–8.3)
NEUTROPHILS NFR BLD AUTO: 61 %
NRBC # BLD AUTO: 0 10E3/UL
NRBC BLD AUTO-RTO: 0 /100
PLATELET # BLD AUTO: 162 10E3/UL (ref 150–450)
POTASSIUM BLD-SCNC: 4.2 MMOL/L (ref 3.4–5.3)
RBC # BLD AUTO: 4.1 10E6/UL (ref 4.4–5.9)
SODIUM SERPL-SCNC: 140 MMOL/L (ref 133–144)
WBC # BLD AUTO: 5.2 10E3/UL (ref 4–11)

## 2022-05-08 PROCEDURE — 86140 C-REACTIVE PROTEIN: CPT | Performed by: HOSPITALIST

## 2022-05-08 PROCEDURE — 82962 GLUCOSE BLOOD TEST: CPT

## 2022-05-08 PROCEDURE — 85014 HEMATOCRIT: CPT | Performed by: HOSPITALIST

## 2022-05-08 PROCEDURE — 80048 BASIC METABOLIC PNL TOTAL CA: CPT | Performed by: HOSPITALIST

## 2022-05-08 PROCEDURE — 85652 RBC SED RATE AUTOMATED: CPT | Performed by: HOSPITALIST

## 2022-05-08 PROCEDURE — 97530 THERAPEUTIC ACTIVITIES: CPT | Mod: GP

## 2022-05-08 PROCEDURE — 99217 PR OBSERVATION CARE DISCHARGE: CPT | Performed by: HOSPITALIST

## 2022-05-08 PROCEDURE — G0378 HOSPITAL OBSERVATION PER HR: HCPCS

## 2022-05-08 PROCEDURE — 36415 COLL VENOUS BLD VENIPUNCTURE: CPT | Performed by: HOSPITALIST

## 2022-05-08 PROCEDURE — 250N000013 HC RX MED GY IP 250 OP 250 PS 637: Performed by: HOSPITALIST

## 2022-05-08 PROCEDURE — 97161 PT EVAL LOW COMPLEX 20 MIN: CPT | Mod: GP

## 2022-05-08 PROCEDURE — 250N000013 HC RX MED GY IP 250 OP 250 PS 637: Performed by: INTERNAL MEDICINE

## 2022-05-08 RX ORDER — ACETAMINOPHEN 325 MG/1
650 TABLET ORAL EVERY 6 HOURS PRN
COMMUNITY
Start: 2022-05-08

## 2022-05-08 RX ADMIN — ASPIRIN 325 MG: 325 TABLET, COATED ORAL at 09:51

## 2022-05-08 RX ADMIN — CARBIDOPA AND LEVODOPA 1 TABLET: 25; 100 TABLET ORAL at 09:51

## 2022-05-08 RX ADMIN — ACETAMINOPHEN 650 MG: 325 TABLET, FILM COATED ORAL at 06:04

## 2022-05-08 RX ADMIN — Medication 25 MCG: at 09:51

## 2022-05-08 NOTE — PROGRESS NOTES
05/08/22 1029   Quick Adds   Type of Visit Initial PT Evaluation   Living Environment   People in Home spouse   Current Living Arrangements house   Living Environment Comments Pt's dtr can stay with pt at DC.   Self-Care   Usual Activity Tolerance good   Current Activity Tolerance moderate   Activity/Exercise/Self-Care Comment pt has walker at home, typically mod I to IND with mobility   General Information   Onset of Illness/Injury or Date of Surgery 05/06/22   Referring Physician John Lizarraga MD   Patient/Family Therapy Goals Statement (PT) To go home   Pertinent History of Current Problem (include personal factors and/or comorbidities that impact the POC) Johny Renee is a 77 year old male patient with past medical history of diabetes mellitus, hypertension, Parkinson's Disease (untreated). Admitted on 5/6/2022 with weakness and found to be Covid +   Existing Precautions/Restrictions fall   Cognition   Affect/Mental Status (Cognition) agitated   Orientation Status (Cognition) oriented x 3   Posture    Posture Forward head position   Strength (Manual Muscle Testing)   Strength Comments BLE > 3/5 strength, use of BUE push off to stand from standard chair ht   Transfers   Comment, (Transfers) STS CGA from standard chair ht, BUE push off, use of walker for stability upon standing   Gait/Stairs (Locomotion)   Comment, (Gait/Stairs) CGA FWW shuffling gait no LOB, downward gaze   Balance   Balance Comments impaired high level dynamic balance   Sensory Examination   Sensory Perception Comments intact to light touch   Clinical Impression   Criteria for Skilled Therapeutic Intervention Yes, treatment indicated   PT Diagnosis (PT) impaired IND with transfers and gait from baseline   Influenced by the following impairments functional weakness, impaired high level balance, impaired activity tolerance   Functional limitations due to impairments see above   Clinical Presentation (PT Evaluation Complexity)  Stable/Uncomplicated   Clinical Presentation Rationale clinical judgement   Clinical Decision Making (Complexity) low complexity   Planned Therapy Interventions (PT) gait training;patient/family education;transfer training;strengthening   PT Discharge Planning   PT Discharge Recommendation (DC Rec) home with assist;home with home care physical therapy   PT Rationale for DC Rec Pt mobilizing CGA-SBA with FWW. Pt ambulated in rm 50' with walker and SBA-CGA no LOB. Tends to suffle however this is baseline. Recommend DC to home with assist from daughter for household tasks and mobility until pt improved to mod I with use of FWW. Recommend HHPT/OT at DC   Total Evaluation Time   Total Evaluation Time (Minutes) 15   Physical Therapy Goals   PT Frequency Daily   PT Predicted Duration/Target Date for Goal Attainment 05/11/22   PT Goals Bed Mobility;Transfers;Gait   PT: Bed Mobility Independent   PT: Transfers Modified independent;Bed to/from chair;Sit to/from stand;Assistive device   PT: Gait Modified independent;50 feet;Supervision/stand-by assist;100 feet;Rolling walker

## 2022-05-08 NOTE — PLAN OF CARE
Physical Therapy Discharge Summary    Reason for therapy discharge:    Discharged to home with home therapy.    Progress towards therapy goal(s). See goals on Care Plan in [facility] electronic health record for goal details.  Goals met    Therapy recommendation(s):    Continued therapy is recommended.  Rationale/Recommendations:  Pt mobilizing CGA-SBA with FWW. Pt ambulated in rm 50' with walker and SBA-CGA no LOB. Tends to suffle however this is baseline. Recommend DC to home with assist from daughter for household tasks and mobility until pt improved to mod I with use of FWW. Recommend HHPT/OT at DC.

## 2022-05-08 NOTE — DISCHARGE SUMMARY
[facility]  Hospitalist Discharge Summary      Date of Admission:  5/6/2022  Date of Discharge:  5/8/2022  Discharging Provider: John Lizarraga MD  Discharge Service: Hospitalist Service    Discharge Diagnoses   Weakness due to Covid 19 infection in the setting of untreated Parkinson's Disease. Fall in the hospital without injury.    Follow-ups Needed After Discharge   Follow-up Appointments     Follow-up and recommended labs and tests       Follow up with primary care provider, Kenneth G. Pallas, within 7 days   for hospital follow- up.  The following labs/tests are recommended: kidney   function. We still recommend you follow-up with a Neurologist to discuss   possible treatment options for your Parkinson's Disease (there are a lot   of new, successful treatments that are not necessarily medications).           Unresulted Labs Ordered in the Past 30 Days of this Admission     No orders found from 4/6/2022 to 5/7/2022.      These results will be followed up by NA    Discharge Disposition   Discharged to home  Condition at discharge: Stable    Hospital Course   Johny Renee is a 77 year old male patient with past medical history of diabetes mellitus, hypertension, Parkinson's Disease (untreated). Admitted on 5/6/2022 with weakness and found to be Covid +     Weakness due to Covid 19 infection    - patient's respiratory status is stable, not needing oxygen    - has some cough    - has weakness/inability to walk due to underlying/untreated Parkinson's disease and now covid    - PT will see on Monday     Fall    - patient had a fall in the hospital this morning    - states the staff did not come in fast enough when he needed to go to the bathroom    - no injury (he slide to the floor from the bed and is actually adamantly stating it was not a fall)    - no imaging needed     Parkinson's Disease    - this was diagnosed a couple years ago during a hospital admission    - he was sent home with  "Sinemet    - daughter states patient refuses to believe he has Parkinson's and refuses to take his meds    - I started Sinemet     HTN    - will hold his home lisinopril as he has had some lower BS here     DM    - on metformin and glyburide    - BS has been stable    - holding meds    - last HbA1c 7.0 2/16/2022    It has been difficult taking care of this patient.  He is continuously yelling at me and disagreeing with any of the diagnoses he has been given.  I tried to explain to him that he is here in the hospital and we are all here to take care of him but it is difficult to take care of him if he will not take the medications that he had been previously prescribed.  Again he denies he has Parkinson's.  I explained to him that this diagnosis was made a couple years ago while he was hospitalized and he was prescribed Sinemet.  I stated that I understood he had gone home and according to his daughter had refused to believe he had Parkinson's and stopped taking his medications.  I tried to again explain that he likely has untreated Parkinson's and given his new COVID infection that has made him weaker.  He questions COVID as well.  He asked about the \"horse pill \".  I indicated that we do not prescribe the \"horse pill \"here in the hospital.  He complains about being in bed and kept in bed with the bed alarm.  I indicated that he had a fall yesterday morning when trying to get out of bed on his own.  Again he is yelling at me stating he did not fall.  I personally helped the staff get him up and into the chair.  He actually had good strength in his legs.  I indicated to him that I can have physical therapy see him.  Patient is again difficult to talk to.  He constantly argues about everything I tried to explain to him.  He interrupts me and does not listen to what I am saying.  He indicates that he wants a new provider.  I stated I would be happy to provide him with a new provider in the morning, as I in not working " tomorrow.  I did call his daughter.  She has indicated to me that her dad is a difficult patient.  She states that he refuses to take medications and does not ever agree with some of the diagnoses he is given.  She states she is talked to them this morning and feels that he would do better at home.  She states that he is angry about being in the hospital.  I indicated I would have the physical therapist assessed him and if she felt he was a safe discharge home given his living situation and that I would discharge him home.  PT did see the patient he has a walker at home he can use.  They recommended home with home services.  I called his daughter back.  She is willing to help temporarily until he is safe on his own.  He will discharge home with home services.    Consultations This Hospital Stay   PHYSICAL THERAPY ADULT IP CONSULT  SOCIAL WORK IP CONSULT  CARE MANAGEMENT / SOCIAL WORK IP CONSULT  PHYSICAL THERAPY ADULT IP CONSULT    Code Status   Full Code    Time Spent on this Encounter   I, John Lizarraga MD, personally saw the patient today and spent greater than 30 minutes discharging this patient.       John Lizarraga MD  [facility] OBSERVATION DEPT  [address] [place] [address]  Phone: [phone]  ______________________________________________________________________    Physical Exam   Vital Signs: Temp: 99  F (37.2  C) Temp src: Oral BP: 136/56 Pulse: 70   Resp: 16 SpO2: 96 % O2 Device: None (Room air)    Weight: 212 lbs 6.4 oz  Constitutional: awake, alert, cooperative, no apparent distress, and appears stated age  Eyes: Lids and lashes normal, pupils equal, round and reactive to light, extra ocular muscles intact, sclera clear, conjunctiva normal  ENT: Normocephalic, without obvious abnormality, atraumatic, sinuses nontender on palpation, external ears without lesions, oral pharynx with moist mucous membranes, tonsils without erythema or exudates, gums normal and good  dentition.  Respiratory: No increased work of breathing, good air exchange, clear to auscultation bilaterally, no crackles or wheezing  Cardiovascular: Normal apical impulse, regular rate and rhythm, normal S1 and S2, no S3 or S4, and no murmur noted  GI: No scars, normal bowel sounds, soft, non-distended, non-tender, no masses palpated, no hepatosplenomegally  Skin: no bruising or bleeding  Musculoskeletal: no lower extremity pitting edema present       Primary Care Physician   Kenneth G. Pallas    Discharge Orders      Home Care Referral      Reason for your hospital stay    Weakness due to Covid 19 infection in the setting of untreated Parkinson's Disease. Fall while hospitalized.     Follow-up and recommended labs and tests     Follow up with primary care provider, Kenneth G. Pallas, within 7 days for hospital follow- up.  The following labs/tests are recommended: kidney function. We still recommend you follow-up with a Neurologist to discuss possible treatment options for your Parkinson's Disease (there are a lot of new, successful treatments that are not necessarily medications).     Activity    Your activity upon discharge: activity as tolerated     Diet    Follow this diet upon discharge: Orders Placed This Encounter      Moderate Consistent Carb (60 g CHO per Meal) Diet       Significant Results and Procedures   Most Recent 3 CBC's:Recent Labs   Lab Test 05/08/22  0812 05/07/22  0642 05/06/22  1840   WBC 5.2 6.5 7.0   HGB 12.5* 12.0* 11.8*   MCV 95 95 93    189 190     Most Recent 3 BMP's:Recent Labs   Lab Test 05/08/22  1012 05/08/22  0812 05/08/22  0200 05/07/22  0817 05/07/22  0642 05/07/22  0027 05/06/22  1752   NA  --  140  --   --  143  --  144  144   POTASSIUM  --  4.2  --   --  4.1  --  4.4  4.4   CHLORIDE  --  113*  --   --  114*  --  116*  114*   CO2  --  22  --   --  24  --  18*  18*   BUN  --  24  --   --  27  --  32*  33*   CR  --  1.53*  --   --  1.64*  --  1.89*  1.86*   ANIONGAP   --  5  --   --  5  --  10  12   TORY  --  8.3*  --   --  8.3*  --  8.9  8.9   * 131* 101*   < > 89   < > 91  91    < > = values in this interval not displayed.     Most Recent 2 LFT's:Recent Labs   Lab Test 05/06/22  1752 01/21/21  1036   AST 30 17   ALT 37 31   ALKPHOS 47 51   BILITOTAL 0.3 0.5   ,   Results for orders placed or performed during the hospital encounter of 05/06/22   US Lower Extremity Venous Duplex Left    Narrative    EXAM: US LOWER EXTREMITY VENOUS DUPLEX LEFT  LOCATION: [facility]  DATE/TIME: 5/6/2022 7:55 PM    INDICATION: Left lower extremity pain, swelling and edema.  COMPARISON: None.  TECHNIQUE: Venous Duplex ultrasound of the left lower extremity with and without compression, augmentation and duplex. Color flow and spectral Doppler with waveform analysis performed.    FINDINGS: Exam includes the common femoral, femoral, popliteal, and contralateral common femoral veins as well as segmentally visualized deep calf veins and greater saphenous vein.     LEFT: No deep vein thrombosis. No superficial thrombophlebitis. No popliteal cyst.      Impression    IMPRESSION:  1.  No deep venous thrombosis in the left lower extremity.   CT Head w/o Contrast    Narrative    EXAM: CT HEAD W/O CONTRAST  LOCATION: [facility]  DATE/TIME: 5/6/2022 8:09 PM    INDICATION: Mental status change, unknown cause  COMPARISON: None.  TECHNIQUE: Routine CT Head without IV contrast. Multiplanar reformats. Dose reduction techniques were used.    FINDINGS:  INTRACRANIAL CONTENTS: No intracranial hemorrhage, extraaxial collection, or mass effect.  No CT evidence of acute infarct. Severe presumed chronic small vessel ischemic changes. Moderate to severe ventriculomegaly with ventricles enlarged   disproportionate to the sulcal atrophy. Findings may reflect normal pressure hydrocephalus, central brain atrophy, or extraventricular noncommunicating hydrocephalus. Please  correlate clinically.     VISUALIZED ORBITS/SINUSES/MASTOIDS: No intraorbital abnormality. Sequelae functional endoscopic sinus surgery. Multiple small nasal polyps. Multiple paranasal sinus retention cysts/mucosal polyps. Right frontal sinus extensively opacified. Left frontal   sinus moderately opacified. Anterior and mid ethmoid air cells severely opacified. Right sphenoid sinus retention cyst/mucosal polyp. Bilateral maxillary sinuses mild mucosal thickening. Areas of increased attenuation may reflect inspissated secretions   and/or fungal colonization. Left mastoid air cells mild patchy opacification. Right mastoid cells essentially clear.    BONES/SOFT TISSUES: No acute abnormality. Vascular calcifications.      Impression    IMPRESSION:  1.  No acute intracranial process.  2.  Chronic intracranial changes described above.       Discharge Medications   Current Discharge Medication List      START taking these medications    Details   acetaminophen (TYLENOL) 325 MG tablet Take 2 tablets (650 mg) by mouth every 6 hours as needed for mild pain or other (and adjunct with moderate or severe pain or per patient request)    Associated Diagnoses: Pain         CONTINUE these medications which have NOT CHANGED    Details   glyBURIDE (DIABETA /MICRONASE) 5 MG tablet TAKE 1 TABLET (5 MG) BY MOUTH DAILY (WITH BREAKFAST)  Qty: 365 tablet, Refills: 0    Associated Diagnoses: Type 2 diabetes mellitus without complication, without long-term current use of insulin (H)      lisinopril (ZESTRIL) 20 MG tablet TAKE 1 TABLET (20 MG) BY MOUTH DAILY  Qty: 365 tablet, Refills: 0    Associated Diagnoses: HTN (hypertension), benign      metFORMIN (GLUCOPHAGE) 1000 MG tablet Take 1,000 mg by mouth 2 times daily (with meals)      Vitamin D3 (CHOLECALCIFEROL) 25 mcg (1000 units) tablet Take 1 tablet (25 mcg) by mouth daily           Allergies   Allergies   Allergen Reactions     Simvastatin Muscle Pain (Myalgia)

## 2022-05-08 NOTE — PLAN OF CARE
PRIMARY DIAGNOSIS: GENERALIZED WEAKNESS, COVID +    OUTPATIENT/OBSERVATION GOALS TO BE MET BEFORE DISCHARGE  1. Orthostatic performed: No    2. Tolerating PO medications: Yes    3. Return to near baseline physical activity: No    4. Cleared for discharge by consultants (if involved): No    Discharge Planner Nurse   Safe discharge environment identified: No  Barriers to discharge: Yes       Entered by: Dary Zapata RN 05/08/2022 3:59 AM     Vitals are Temp: 97.6  F (36.4  C) Temp src: Oral BP: 113/59 Pulse: 68   Resp: 16 SpO2: 97 %.  Patient is Alert and Oriented x4. They are 2 assist with Gait Belt and Walker.  On special precautions for positive COVID results.  Pt sleeping. Tele SR with hr 67. Using urinal. Attempted to self transfer x1. Has sitter outside room with Ipad for safety and continuous monitoring. Bed alarm on. Will continue to monitor and provide supportive cares.      Please review provider order for any additional goals.   Nurse to notify provider when observation goals have been met and patient is ready for discharge.

## 2022-05-08 NOTE — PLAN OF CARE
"Goal Outcome Evaluation:  [id number]    PRIMARY DIAGNOSIS: GENERALIZED WEAKNESS + COVID    OUTPATIENT/OBSERVATION GOALS TO BE MET BEFORE DISCHARGE  1. Orthostatic performed: N/A    2. Tolerating PO medications: Yes    3. Return to near baseline physical activity: No    4. Cleared for discharge by consultants (if involved): No - PT to see in AM    Discharge Planner Nurse   Safe discharge environment identified: No  Barriers to discharge: Yes       Entered by: Rubi Andrew RN 05/07/2022 8:25 PM  Hypertensive on RA. Alert and oriented x4 but forgetful. Required no sliding scale this evening. Had a conversation with patient regarding his sinemet. Pt told sitter that he would like to take it but when I brought it in he refused saying that he would potentially think of taking a different med that started with a \"P\" because it was a med that his father (who also had parkinsons) took. When I asked the patient if he meant carbidopa-levodopa he said yes that is the med. I then informed the patient that carbidopa-levodopa is in fact sinemet. Pt still refused med because he would like to have a one on one conversation with his doctor regarding his diagnosis of parkinson's because he still does not believe that he has it. He also has some concerns about the med because he said his father had some kind of bad reaction to the med and is afraid the same may happen to him and that he also got bad headaches from the med and that is the reason he stopped taking it. Pt is on Tele reading SR with some PAC and PVC's HR 70s. Plan for pt to see PT in AM - needs TCU but pt is unvaccinated and Covid positive. Will continue monitoring and providing cares.     Please review provider order for any additional goals.   Nurse to notify provider when observation goals have been met and patient is ready for discharge.    "

## 2022-05-08 NOTE — PLAN OF CARE
PRIMARY DIAGNOSIS: GENERALIZED WEAKNESS    OUTPATIENT/OBSERVATION GOALS TO BE MET BEFORE DISCHARGE  1. Orthostatic performed: N/A    2. Tolerating PO medications: Yes    3. Return to near baseline physical activity: Yes    4. Cleared for discharge by consultants (if involved): Yes    Discharge Planner Nurse   Safe discharge environment identified: Yes  Barriers to discharge: No       Entered by: Yari Ritter RN 05/08/2022 11:49 AM     Please review provider order for any additional goals.   Nurse to notify provider when observation goals have been met and patient is ready for discharge.Goal Outcome Evaluation:

## 2022-05-08 NOTE — DISCHARGE INSTRUCTIONS
Your home care referral was sent to [facility]  If you haven't heard from them within the next 24-48 hours,  Please call them at [phone]

## 2022-05-08 NOTE — CONSULTS
Care Management Initial Consult    General Information  Assessment completed with: Patient,    Type of CM/SW Visit: Initial Assessment    Primary Care Provider verified and updated as needed: Yes   Readmission within the last 30 days:        Reason for Consult: care coordination/care conference, discharge planning      Communication Assessment  Patient's communication style: spoken language (English or Bilingual)    Hearing Difficulty or Deaf: no   Wear Glasses or Blind: yes    Cognitive  Cognitive/Neuro/Behavioral: WDL  Level of Consciousness: alert  Arousal Level: opens eyes spontaneously  Orientation: oriented x 4  Mood/Behavior: angry, uncooperative  Best Language: 0 - No aphasia  Speech: clear, spontaneous    Living Environment:   People in home: spouse     Current living Arrangements: house      Able to return to prior arrangements: yes       Family/Social Support:  Care provided by: self, spouse/significant other  Provides care for: no one  Marital Status:   Wife, Children          Description of Support System: Supportive         Current Resources:   Patient receiving home care services: No     Community Resources: None  Equipment currently used at home: walker, standard  Supplies currently used at home:      Employment/Financial:  Employment Status: retired        Financial Concerns: No concerns identified           Lifestyle & Psychosocial Needs:  Social Determinants of Health     Tobacco Use: Medium Risk     Smoking Tobacco Use: Former Smoker     Smokeless Tobacco Use: Never Used   Alcohol Use: Unknown     Frequency of Alcohol Consumption: 2-4 times a month     Average Number of Drinks: Not on file     Frequency of Binge Drinking: Not on file   Financial Resource Strain: Not on file   Food Insecurity: Not on file   Transportation Needs: Not on file   Physical Activity: Not on file   Stress: Not on file   Social Connections: Not on file   Intimate Partner Violence: Not on file   Depression: Not at  risk     PHQ-2 Score: 0   Housing Stability: Not on file       Functional Status:  Prior to admission patient needed assistance:    no    Mental Health Status:  Mental Health Status: No Current Concerns       Chemical Dependency Status:  Chemical Dependency Status: No Current Concerns           Additional Information:  Pt lives with his wife.  He said his Dtg, Son and neighbors are very supportive.  He has a walker at home that he can use.  He has one step into the house and then all main floor living.  Pt is agreeable to recommendations of home care support.  I sent a referral to [facility].  Pt said Son can pick him up when paperwork complete.    Sangeeta Pizano RN BSN   Inpatient Care Coordination  [facility]  [phone]      Kristel Pizano, RN

## 2022-05-09 LAB
ATRIAL RATE - MUSE: 93 BPM
DIASTOLIC BLOOD PRESSURE - MUSE: NORMAL MMHG
INTERPRETATION ECG - MUSE: NORMAL
P AXIS - MUSE: 34 DEGREES
PR INTERVAL - MUSE: 148 MS
QRS DURATION - MUSE: 84 MS
QT - MUSE: 346 MS
QTC - MUSE: 430 MS
R AXIS - MUSE: 32 DEGREES
SYSTOLIC BLOOD PRESSURE - MUSE: NORMAL MMHG
T AXIS - MUSE: 24 DEGREES
VENTRICULAR RATE- MUSE: 93 BPM

## 2022-05-11 LAB
ATRIAL RATE - MUSE: 83 BPM
DIASTOLIC BLOOD PRESSURE - MUSE: NORMAL MMHG
INTERPRETATION ECG - MUSE: NORMAL
P AXIS - MUSE: 9 DEGREES
PR INTERVAL - MUSE: 150 MS
QRS DURATION - MUSE: 86 MS
QT - MUSE: 346 MS
QTC - MUSE: 406 MS
R AXIS - MUSE: 48 DEGREES
SYSTOLIC BLOOD PRESSURE - MUSE: NORMAL MMHG
T AXIS - MUSE: 81 DEGREES
VENTRICULAR RATE- MUSE: 83 BPM

## 2022-05-17 NOTE — PROGRESS NOTES
Late entry: SW received call about this patient who discharged from this hospital and was set up with [facility] by the care management team. SW spoke with daughter Rachel who verified  and is on the contact list. She reports they had 1 home care visit and then they stopped coming. LOAN suggested calling [facility] and provided the number.     MAURY Peterson, [facility]  , ED Care Management   [phone]

## 2022-06-12 ENCOUNTER — HEALTH MAINTENANCE LETTER (OUTPATIENT)
Age: 77
End: 2022-06-12

## 2022-06-22 DIAGNOSIS — I10 HTN (HYPERTENSION), BENIGN: ICD-10-CM

## 2022-06-22 NOTE — TELEPHONE ENCOUNTER
ACE Inhibitors (Including Combos) Protocol Failed 06/22/2022 10:32 AM   Protocol Details  Recent (12 mo) or future (30 days) visit within the authorizing provider's specialty    Normal serum creatinine on file in past 12 months     Pt due for an Ov please call to schedule (fasting px)     Thank you     Beverly Nelson RN, BSN  [facility]

## 2022-06-23 RX ORDER — LISINOPRIL 20 MG/1
20 TABLET ORAL DAILY
Qty: 365 TABLET | Refills: 0 | Status: SHIPPED | OUTPATIENT
Start: 2022-06-23

## 2022-10-23 ENCOUNTER — HEALTH MAINTENANCE LETTER (OUTPATIENT)
Age: 77
End: 2022-10-23

## 2023-03-03 ENCOUNTER — HOSPITAL ENCOUNTER (EMERGENCY)
Facility: CLINIC | Age: 78
Discharge: HOME | End: 2023-03-03
Attending: EMERGENCY MEDICINE | Admitting: EMERGENCY MEDICINE
Payer: MEDICARE

## 2023-03-03 ENCOUNTER — MEDICAL CORRESPONDENCE (OUTPATIENT)
Dept: EMERGENCY MEDICINE | Facility: CLINIC | Age: 78
End: 2023-03-03

## 2023-03-03 DIAGNOSIS — F10.920 ALCOHOLIC INTOXICATION WITHOUT COMPLICATION (H): ICD-10-CM

## 2023-03-03 LAB
ANION GAP SERPL CALCULATED.3IONS-SCNC: 23 MMOL/L (ref 7–15)
BASOPHILS # BLD AUTO: 0.1 10E3/UL (ref 0–0.2)
BASOPHILS NFR BLD AUTO: 1 %
BUN SERPL-MCNC: 17.7 MG/DL (ref 8–23)
CALCIUM SERPL-MCNC: 8.9 MG/DL (ref 8.8–10.2)
CHLORIDE SERPL-SCNC: 103 MMOL/L (ref 98–107)
CREAT SERPL-MCNC: 1.77 MG/DL (ref 0.67–1.17)
DEPRECATED HCO3 PLAS-SCNC: 14 MMOL/L (ref 22–29)
EOSINOPHIL # BLD AUTO: 0.2 10E3/UL (ref 0–0.7)
EOSINOPHIL NFR BLD AUTO: 1 %
ERYTHROCYTE [DISTWIDTH] IN BLOOD BY AUTOMATED COUNT: 12.4 % (ref 10–15)
ETHANOL SERPL-MCNC: 0.09 G/DL
GFR SERPL CREATININE-BSD FRML MDRD: 39 ML/MIN/1.73M2
GLUCOSE SERPL-MCNC: 118 MG/DL (ref 70–99)
HCT VFR BLD AUTO: 38.8 % (ref 40–53)
HGB BLD-MCNC: 12.8 G/DL (ref 13.3–17.7)
HOLD SPECIMEN: NORMAL
IMM GRANULOCYTES # BLD: 0.1 10E3/UL
IMM GRANULOCYTES NFR BLD: 1 %
LYMPHOCYTES # BLD AUTO: 2.9 10E3/UL (ref 0.8–5.3)
LYMPHOCYTES NFR BLD AUTO: 28 %
MCH RBC QN AUTO: 30.7 PG (ref 26.5–33)
MCHC RBC AUTO-ENTMCNC: 33 G/DL (ref 31.5–36.5)
MCV RBC AUTO: 93 FL (ref 78–100)
MONOCYTES # BLD AUTO: 0.9 10E3/UL (ref 0–1.3)
MONOCYTES NFR BLD AUTO: 8 %
NEUTROPHILS # BLD AUTO: 6.4 10E3/UL (ref 1.6–8.3)
NEUTROPHILS NFR BLD AUTO: 61 %
NRBC # BLD AUTO: 0 10E3/UL
NRBC BLD AUTO-RTO: 0 /100
PLATELET # BLD AUTO: 268 10E3/UL (ref 150–450)
POTASSIUM SERPL-SCNC: 4 MMOL/L (ref 3.4–5.3)
RBC # BLD AUTO: 4.17 10E6/UL (ref 4.4–5.9)
SODIUM SERPL-SCNC: 140 MMOL/L (ref 136–145)
WBC # BLD AUTO: 10.4 10E3/UL (ref 4–11)

## 2023-03-03 PROCEDURE — 80048 BASIC METABOLIC PNL TOTAL CA: CPT | Performed by: EMERGENCY MEDICINE

## 2023-03-03 PROCEDURE — 36415 COLL VENOUS BLD VENIPUNCTURE: CPT | Performed by: EMERGENCY MEDICINE

## 2023-03-03 PROCEDURE — 82077 ASSAY SPEC XCP UR&BREATH IA: CPT | Performed by: EMERGENCY MEDICINE

## 2023-03-03 PROCEDURE — 96372 THER/PROPH/DIAG INJ SC/IM: CPT | Performed by: EMERGENCY MEDICINE

## 2023-03-03 PROCEDURE — 85025 COMPLETE CBC W/AUTO DIFF WBC: CPT | Performed by: EMERGENCY MEDICINE

## 2023-03-03 PROCEDURE — 250N000011 HC RX IP 250 OP 636: Performed by: EMERGENCY MEDICINE

## 2023-03-03 PROCEDURE — 99285 EMERGENCY DEPT VISIT HI MDM: CPT

## 2023-03-03 RX ORDER — OLANZAPINE 10 MG/2ML
10 INJECTION, POWDER, FOR SOLUTION INTRAMUSCULAR ONCE
Status: COMPLETED | OUTPATIENT
Start: 2023-03-03 | End: 2023-03-03

## 2023-03-03 RX ADMIN — OLANZAPINE 10 MG: 10 INJECTION, POWDER, FOR SOLUTION INTRAMUSCULAR at 19:00

## 2023-03-03 ASSESSMENT — ACTIVITIES OF DAILY LIVING (ADL)
ADLS_ACUITY_SCORE: 35
ADLS_ACUITY_SCORE: 35

## 2023-03-04 NOTE — ED TRIAGE NOTES
Pt arrives by EMS with complaint of alcohol intoxication. He was found at a bar in town verbally combative and and refusing to leave when asked. He fell when walking out of the bar. Staff reports 2nd fall of the night. Police put him on a hold.      Triage Assessment     Row Name 03/03/23 [id number]       Triage Assessment (Adult)    Airway WDL WDL       Respiratory WDL    Respiratory WDL WDL       Skin Circulation/Temperature WDL    Skin Circulation/Temperature WDL WDL       Cardiac WDL    Cardiac WDL WDL       Peripheral/Neurovascular WDL    Peripheral Neurovascular WDL WDL       Cognitive/Neuro/Behavioral WDL    Cognitive/Neuro/Behavioral WDL X  intoxicated. yelling, verbally abusive

## 2023-03-04 NOTE — ED NOTES
Pt still quite argumentative with staff, asking for his cell phone so that he can take photos of staff members and call 911 because his rights have been taken away.  Restraints were removed per MD request.  Son is on the way to  pt.  Pt was educated multiple times that he will receive all of his belongings as soon as his son arrives.  Belongings are in a patient belongings bag at the security desk in [place].  Awaiting son's arrival.  Will continue to assess and monitor pt.

## 2023-03-04 NOTE — ED NOTES
Family here to  pt.  Son will take responsibility for getting pt home safely.  Pt is still angry and having difficulty understanding why he was brought to the ED.

## 2023-03-04 NOTE — ED NOTES
"Pt sent home with family, states that his wallet is missing, pt is attempting to call wife to see if she has it.  Pt is more cooperative now that he is leaving.  Pt will not allow writer to take VS or check scratch on left hand.  Pt states \"I will take care of it when I get home\".  Pt states he still doesn't understand why he was brought to the ED.  Family states this is typical behavior for pt.  Pt requests name of dr and meds that were given to him while he was here.  These were written down on paper for pt and given per his request.  Pt states he is planning on filing a police report, writer informed pt that he has the right to do so but while he was in the hospital he was put on a medical hold for his medical protection.  Pt denies any problems.  Pt was sent home with son via wheel chair to his car.   "

## 2023-03-04 NOTE — ED NOTES
"Pt arrives in rm 6 in 5 point restraints.  Pt states that his \"rights are being denied\" and wants writer to call 911 and talk to either the police or the [facility].  Pt was educated on the reasons for the restraints, as reported he has been belligerent and threatening to staff.  Writer noted some dried blood on pt's left hand, pt scratched and and grabbed at writers hand when attempting to look for source of old blood.  Pt states that he was brought into the hospital because he was having trouble walking, but that, that isn't reason to put him in restraints.  Pt was asked to stop yelling and that when he is calm and is no longer threatening staff he will be allowed out of restraints.  Pt states he wants his cell phone so that he can call 911 because we are denying his \"god given rights\" by \"strapping him down\".  He states that staff are lairs and are making up more and more lies about him.  Pt was offered a urinal but although he states he needs to use the bathroom he refuses to use the urinal.  Writer will continue to assess and monitor pt.  "

## 2023-03-04 NOTE — ED PROVIDER NOTES
History     Chief Complaint:  Alcohol Intoxication       HPI   Johny Renee is a 77 year old male with a history of Type 2 diabetes, Stage 3 CKD, and Parkinson's disease, who presents on a police hold for alcohol intoxication and acting belligerently at a restaurant.  EMS provides history    Independent Historian:   None - Patient Only    Review of External Notes: None    ROS:  Review of Systems   Unable to perform ROS: Mental status change     Allergies:  Simvastatin     Medications:    Glyburide  Lisinopril  Metformin  Crestor  Jardiance    Past Medical History:    Type 2 diabetes  Hypertension  Stage 3 CKD  Pure hypercholesterolemia  Carcinoma in situ of skin  Hyperlipidemia  Parkinson's Disease    Past Surgical History:    Hernia repair  Nasal poly excision  Neck surgery  Vasectomy  Disc surgery x2     Family History:    Parkinsonism  Type 2 diabetes  Heart disease  Lung Cancer    Social History:  The patient presents to the ED alone.  The patient arrived by private vehicle.  Reports that he has quit smoking. His smoking use included cigarettes. He has a 15.00 pack-year smoking history. He has never used smokeless tobacco. He reports current alcohol use. He reports that he does not use drugs.  PCP: Pallas, Kenneth G     Physical Exam   No data found.     Physical Exam  Gen: well appearing, in no acute distress  Oral : Mucous membranes moist,   Nose: No rhinorhea  Ears: External near normal, without drainage  Eyes: periorbital tissues and sclera normal   Neck: supple, no abnormal swelling  Lungs: Clear bilaterally, no tachypnea or distress, speaks full sentences  CV: Regular rate, regular rhythm  Abd: soft, nontender, nondistended, no rebound/guarding  Ext: no lower extremity edema  Skin: warm, dry, well perfused, no rashes/bruising/lesions on exposed skin  Neuro: alert, no gross motor or sensory deficits,   Psych: pleasant mood, normal affect    Emergency Department Course         Imaging:  No orders to  display          Laboratory:  Labs Ordered and Resulted from Time of ED Arrival to Time of ED Departure   ETHYL ALCOHOL LEVEL - Abnormal       Result Value    Alcohol ethyl 0.09 (*)    BASIC METABOLIC PANEL - Abnormal    Sodium 140      Potassium 4.0      Chloride 103      Carbon Dioxide (CO2) 14 (*)     Anion Gap 23 (*)     Urea Nitrogen 17.7      Creatinine 1.77 (*)     Calcium 8.9      Glucose 118 (*)     GFR Estimate 39 (*)    CBC WITH PLATELETS AND DIFFERENTIAL - Abnormal    WBC Count 10.4      RBC Count 4.17 (*)     Hemoglobin 12.8 (*)     Hematocrit 38.8 (*)     MCV 93      MCH 30.7      MCHC 33.0      RDW 12.4      Platelet Count 268      % Neutrophils 61      % Lymphocytes 28      % Monocytes 8      % Eosinophils 1      % Basophils 1      % Immature Granulocytes 1      NRBCs per 100 WBC 0      Absolute Neutrophils 6.4      Absolute Lymphocytes 2.9      Absolute Monocytes 0.9      Absolute Eosinophils 0.2      Absolute Basophils 0.1      Absolute Immature Granulocytes 0.1      Absolute NRBCs 0.0          Procedures       Emergency Department Course & Assessments:             Interventions:  Medications - No data to display     Assessments:  1850 I checked the patient and obtained history.  2009 I rechecked the patient and explained findings and discussed discharge.       Independent Interpretation (X-rays, CTs, rhythm strip):  None    Consultations/Discussion of Management or Tests:  2001 I consulted with the patient's daughter, Rachel, regarding his condition.        Social Determinants of Health affecting care:   None    Disposition:  The patient was discharged to home.     Impression & Plan      Medical Decision Making:  Patient presents emergency department MAJOR hold put on by police.  Currently they were called to a restaurant where patient was acting belligerently, concern for alcohol intoxication.  Patient does have some alcohol on board.,  No signs of trauma externally moving all extremities no cuts  bruises on his scalp.  Patient initially had 2 be restrained and sedated due to his unwillingness to follow commands, he was verbally assaultive towards staff.  I called and spoke with the patient's daughter she reports he has Parkinson's disease and does not take any medications for it.  She reports he is typically kind of angry towards other people, this is normal behavior for him.  His 78th birthday is coming up so she thinks he was out celebrating in preparation for that.  She does report he drinks alcohol somewhat frequently.  We will work on getting patient out of restraints, his son is coming to pick him up in the next 30 to 45 minutes.  We will work on getting him discharged in the company of his family.      Diagnosis:    ICD-10-CM    1. Alcoholic intoxication without complication (H)  F10.920            Discharge Medications:  New Prescriptions    No medications on file          Scribe Disclosure:  I, Matt Rodriguez, am serving as a scribe at 7:00 PM on 3/3/2023 to document services personally performed by Javier Daniels MD, based on my observations and the provider's statements to me.   3/3/2023   Javier Daniels MD Tschetter, Paul Anthony, MD  03/03/23 2015

## 2023-03-04 NOTE — ED PROVIDER NOTES
In person face to face assessment completed, including an evaluation of the patient's immediate reaction to the intervention, complete review of systems assessment, behavioral assessment and review/assessment of history, drugs and medications, recent labs, etc., and behavioral condition.  The patient experienced: No adverse physical outcome from seclusion/restraint initiation.  The intervention of restraint or seclusion needs to continue.    6:56 PM         Javier Daniels MD  03/03/23 0806

## 2023-03-06 ENCOUNTER — NURSE TRIAGE (OUTPATIENT)
Dept: FAMILY MEDICINE | Facility: CLINIC | Age: 78
End: 2023-03-06
Payer: MEDICARE

## 2023-03-06 NOTE — TELEPHONE ENCOUNTER
Message handled by Nurse Triage with [facility] - provider name: Dr. Hayden - recommend to be seen at [facility] urgent care today.    Called and spoke with patient. Advised of Dr. Hayden's recommendations. Patient stated an understanding and agreed with plan.     SONJA CASSIDY RN on 3/6/2023 at 12:38 PM   [facility]

## 2023-03-06 NOTE — TELEPHONE ENCOUNTER
"S-(situation): New onset knee pain    B-(background): Recent ED discharge    A-(assessment): No redness or swelling in knee but very painful-- see highlighted triage assessment below for further details. Rates pain at 8    R-(recommendations): Protocol says to see today. Patient only wanting appointment at certain locations.     Patient says he no longer sees [facility] but his doctor is on vacation and is insistent on speaking to someone at [facility].     Was in ED on 3/3/23. Says he was released and they gave him a sedative and he thinks he had a reaction to that because he says \"I can barely walk\"  He says he doesn't know if it's a reaction from the medication or complication of diabetes. Says he now goes to [facility] in [place] but does not like going there.     Per triage he needs to be seen today. I was going to send to care team to check for openings and review for level 2 triage. Patient says he is unsure if he wants to go to [facility] or [facility] but said he's prefer to go to [facility]. I have transferred him back to central scheduling to make appointment. Stressed patient needs to be seen today for further evaluation.  He asked if he should go to ED. I informed him this is why I wanted his care team to review and call him back with recommendation after review with provider as ED may or may not be appropriate. Patient declined this saying he does not want to go to [place] because he lives closer to [facility].     Will still route to Dr. Hayden team for level 2 recommendation as I am unsure if patient will follow through with recommendations given by triage RN to be seen today.     Also of note looks like patient has appointment to see his [facility] provider tomorrow. I'm not sure if he made this appointment before or after I talked to him.     Nori Caruso R.N.              Reason for Disposition    SEVERE pain (e.g., excruciating, unable to walk)    Additional Information    Negative: Sounds like a life-threatening " "emergency to the triager    Negative: Followed a knee injury    Negative: Thigh or calf pain and only 1 side and present > 1 hour    Negative: Thigh or calf swelling and only 1 side    Negative: Patient sounds very sick or weak to the triager    Negative: Can't move swollen joint at all    Answer Assessment - Initial Assessment Questions  1. LOCATION and RADIATION: \"Where is the pain located?\"       Right knee  2. QUALITY: \"What does the pain feel like?\"  (e.g., sharp, dull, aching, burning)      achey\" I can barely move my knee it's so painful\"  3. SEVERITY: \"How bad is the pain?\" \"What does it keep you from doing?\"   (Scale 1-10; or mild, moderate, severe)    -  MILD (1-3): doesn't interfere with normal activities     -  MODERATE (4-7): interferes with normal activities (e.g., work or school) or awakens from sleep, limping     -  SEVERE (8-10): excruciating pain, unable to do any normal activities, unable to walk      8  4. ONSET: \"When did the pain start?\" \"Does it come and go, or is it there all the time?\"      3/4/23  5. RECURRENT: \"Have you had this pain before?\" If Yes, ask: \"When, and what happened then?\"      No  6. SETTING: \"Has there been any recent work, exercise or other activity that involved that part of the body?\"       No  7. AGGRAVATING FACTORS: \"What makes the knee pain worse?\" (e.g., walking, climbing stairs, running)      Walking -- anytime leg is used.   8. ASSOCIATED SYMPTOMS: \"Is there any swelling or redness of the knee?\"      No  9. OTHER SYMPTOMS: \"Do you have any other symptoms?\" (e.g., chest pain, difficulty breathing, fever, calf pain)      No  10. PREGNANCY: \"Is there any chance you are pregnant?\" \"When was your last menstrual period?\"        NA    Protocols used: KNEE PAIN-A-OH      "

## 2023-04-02 ENCOUNTER — HEALTH MAINTENANCE LETTER (OUTPATIENT)
Age: 78
End: 2023-04-02

## 2023-07-05 ENCOUNTER — TRANSFERRED RECORDS (OUTPATIENT)
Dept: MULTI SPECIALTY CLINIC | Facility: CLINIC | Age: 78
End: 2023-07-05

## 2023-07-05 LAB — RETINOPATHY: NORMAL

## 2023-08-04 ENCOUNTER — OFFICE VISIT (OUTPATIENT)
Dept: FAMILY MEDICINE | Facility: CLINIC | Age: 78
End: 2023-08-04
Payer: MEDICARE

## 2023-08-04 VITALS
HEART RATE: 76 BPM | HEIGHT: 71 IN | OXYGEN SATURATION: 100 % | RESPIRATION RATE: 18 BRPM | DIASTOLIC BLOOD PRESSURE: 70 MMHG | WEIGHT: 198.4 LBS | SYSTOLIC BLOOD PRESSURE: 133 MMHG | TEMPERATURE: 98.4 F | BODY MASS INDEX: 27.77 KG/M2

## 2023-08-04 DIAGNOSIS — N18.32 STAGE 3B CHRONIC KIDNEY DISEASE (H): ICD-10-CM

## 2023-08-04 DIAGNOSIS — E11.69 TYPE 2 DIABETES MELLITUS WITH OTHER SPECIFIED COMPLICATION, WITHOUT LONG-TERM CURRENT USE OF INSULIN (H): Primary | ICD-10-CM

## 2023-08-04 DIAGNOSIS — E78.2 MIXED HYPERLIPIDEMIA: ICD-10-CM

## 2023-08-04 LAB
ANION GAP SERPL CALCULATED.3IONS-SCNC: 11 MMOL/L (ref 7–15)
BUN SERPL-MCNC: 32.5 MG/DL (ref 8–23)
CALCIUM SERPL-MCNC: 9.3 MG/DL (ref 8.8–10.2)
CHLORIDE SERPL-SCNC: 107 MMOL/L (ref 98–107)
CHOLEST SERPL-MCNC: 170 MG/DL
CREAT SERPL-MCNC: 1.71 MG/DL (ref 0.67–1.17)
DEPRECATED HCO3 PLAS-SCNC: 21 MMOL/L (ref 22–29)
GFR SERPL CREATININE-BSD FRML MDRD: 40 ML/MIN/1.73M2
GLUCOSE SERPL-MCNC: 141 MG/DL (ref 70–99)
HBA1C MFR BLD: 6.7 % (ref 0–5.6)
HDLC SERPL-MCNC: 36 MG/DL
LDLC SERPL CALC-MCNC: 110 MG/DL
NONHDLC SERPL-MCNC: 134 MG/DL
POTASSIUM SERPL-SCNC: 4.8 MMOL/L (ref 3.4–5.3)
SODIUM SERPL-SCNC: 139 MMOL/L (ref 136–145)
TRIGL SERPL-MCNC: 121 MG/DL

## 2023-08-04 PROCEDURE — 36415 COLL VENOUS BLD VENIPUNCTURE: CPT

## 2023-08-04 PROCEDURE — 99207 PR FOOT EXAM NO CHARGE: CPT

## 2023-08-04 PROCEDURE — 99214 OFFICE O/P EST MOD 30 MIN: CPT

## 2023-08-04 PROCEDURE — 83036 HEMOGLOBIN GLYCOSYLATED A1C: CPT

## 2023-08-04 PROCEDURE — 80061 LIPID PANEL: CPT

## 2023-08-04 PROCEDURE — 80048 BASIC METABOLIC PNL TOTAL CA: CPT

## 2023-08-04 NOTE — PROGRESS NOTES
"  Assessment & Plan     (E11.69) Type 2 diabetes mellitus with other specified complication, without long-term current use of insulin (H)  (primary encounter diagnosis)  Comment: sugars overall seem fairly well controlled. Will get A1C today for evaluation. Metformin decreased d/t lower GFR.  Foot exam noted some decreased sensation on bilateral pads of feet. Discussed good foot hygiene w/ patient. Follow up on lab results w/ patient and whether further direction necessary. Patient fully understands and is agreeable with plan of care, at this point patient will follow up as needed unless acute concerns arise in the meantime.  Plan: HEMOGLOBIN A1C, Lipid panel reflex to direct         LDL Non-fasting, Basic metabolic panel  (Ca,         Cl, CO2, Creat, Gluc, K, Na, BUN), FOOT EXAM    (N18.32) Stage 3b chronic kidney disease (H)  Comment: repeat BMP today. Patient decreased Metformin at last appointment to 500 mg BID. Will keep at this for now, follow up on results and whether further direction is necessary. Patient fully understands and is agreeable with plan of care, at this point patient will follow up as needed unless acute concerns arise in the meantime.  Plan: Basic metabolic panel  (Ca, Cl, CO2, Creat,         Gluc, K, Na, BUN)    (E78.2) Mixed hyperlipidemia  Comment: fasting lipid panel today, will follow up on results when obtained.   Plan: Lipid panel    30 minutes spent by me on the date of the encounter doing chart review, history and exam, documentation and further activities per the note       BMI:   Estimated body mass index is 28.07 kg/m  as calculated from the following:    Height as of this encounter: 1.791 m (5' 10.5\").    Weight as of this encounter: 90 kg (198 lb 6.4 oz).       ASHU Richardson [facility]    Jeff Mcclain is a 78 year old, presenting for the following health issues:  Diabetes and Follow Up        8/4/2023    10:32 AM   Additional Questions " "  Roomed by Delilah Berg       [facility]     Diabetes Follow-up    How often are you checking your blood sugar? A few times a month  What time of day are you checking your blood sugars (select all that apply)?  Not applicable  Have you had any blood sugars above 200?  No  Have you had any blood sugars below 70?  No  What symptoms do you notice when your blood sugar is low?  None  What concerns do you have today about your diabetes? None   Do you have any of these symptoms? (Select all that apply)  Numbness in feet  Have you had a diabetic eye exam in the last 12 months? Yes- Date of last eye exam: 7/5/2023,  Location: w/ Dr. Alycia a [facility] in [place].        BP Readings from Last 2 Encounters:   08/04/23 133/70   05/08/22 136/56     Hemoglobin A1C (%)   Date Value   01/21/2021 7.5 (H)   08/24/2020 7.3 (H)     LDL Cholesterol Calculated (mg/dL)   Date Value   01/21/2021 122 (H)   08/24/2020 36         How many servings of fruits and vegetables do you eat daily?  0-1  On average, how many sweetened beverages do you drink each day (Examples: soda, juice, sweet tea, etc.  Do NOT count diet or artificially sweetened beverages)?   0  How many days per week do you exercise enough to make your heart beat faster? 3 or less  How many minutes a day do you exercise enough to make your heart beat faster? 9 or less  How many days per week do you miss taking your medication? 0    BS typically around 130. Has had BS in 70s, typically not many symptoms w/ this.  Knows when has lower BS and takes food at this point.      Review of Systems   Constitutional, cardiovascular, pulmonary, GI, , musculoskeletal, neuro, skin, endocrine and systems are negative, except as otherwise noted.      Objective    /70 (BP Location: Right arm, Patient Position: Sitting, Cuff Size: Adult Regular)   Pulse 76   Temp 98.4  F (36.9  C) (Oral)   Resp 18   Ht 1.791 m (5' 10.5\")   Wt 90 kg (198 lb 6.4 oz)   SpO2 100%   BMI 28.07 kg/m    Body " mass index is 28.07 kg/m .  Physical Exam  Vitals and nursing note reviewed.   Constitutional:       General: He is not in acute distress.     Appearance: Normal appearance. He is not ill-appearing.   Cardiovascular:      Rate and Rhythm: Normal rate and regular rhythm.      Pulses:           Dorsalis pedis pulses are 1+ on the right side and 1+ on the left side.        Posterior tibial pulses are 1+ on the right side and 1+ on the left side.      Heart sounds: No murmur heard.     No friction rub. No gallop.   Pulmonary:      Effort: Pulmonary effort is normal. No respiratory distress.      Breath sounds: No wheezing, rhonchi or rales.   Musculoskeletal:        Feet:    Feet:      Right foot:      Protective Sensation:   7 sites sensed.      Skin integrity: Skin integrity normal.      Toenail Condition: Right toenails are normal.      Left foot:      Protective Sensation:   7 sites sensed.      Skin integrity: Skin integrity normal.      Toenail Condition: Left toenails are normal.      Comments: Decreased sensation in circled areas.  Skin:     General: Skin is warm and dry.   Neurological:      Mental Status: He is alert.   Psychiatric:         Mood and Affect: Mood normal.         Behavior: Behavior normal. Behavior is cooperative.         Thought Content: Thought content normal.         Judgment: Judgment normal.

## 2023-08-07 ENCOUNTER — TELEPHONE (OUTPATIENT)
Dept: FAMILY MEDICINE | Facility: CLINIC | Age: 78
End: 2023-08-07
Payer: MEDICARE

## 2023-08-07 NOTE — TELEPHONE ENCOUNTER
Called pt and relayed provider message below. Patient was given an opportunity to ask questions, verbalized understanding of plan, and is agreeable.    Scheduled pt for lab only appt on 11/20/23 to recheck BMP.    Pt requests lab results be mailed to him. Printed lab results and placed at  to be mailed per clinic protocol.    Kristel GRANT RN

## 2023-08-07 NOTE — TELEPHONE ENCOUNTER
Lowell Sheffield APRN CNP  P Cr Triage  Please notify patient that his Hgb A1c continues to improve, BMP roughly the same a bit better we will stay at where he is on his metformin recommend re checking this in 3-6 months (I will place this order), cholesterol panel looks to improved but still a bit elevated. Dietary, exercise, and lifestyle modifications. Let me know If he has any questions/concerns.    ASHU Richardson CNP

## 2023-08-09 NOTE — TELEPHONE ENCOUNTER
Pt calls, wants to discuss results, does not recall earlier conversation, has been diabetic for years, wants to establish care with new PCP, informed Lowell is on extender and not a PCP, recommend appointment in 3 months or before 1/2024 with PCP of his choosing, pt does not want to go to PCP listed at [facility]    ROUTED to [facility], please call pt to make appointment to establish care with new PCP in 3-4 months for DM, hypertension and chronic disease follow up  Jessica Joseph RN, BSN  [facility]

## 2023-08-15 ENCOUNTER — NURSE TRIAGE (OUTPATIENT)
Dept: PEDIATRICS | Facility: CLINIC | Age: 78
End: 2023-08-15
Payer: MEDICARE

## 2023-08-15 NOTE — TELEPHONE ENCOUNTER
"S-(situation): Received call from patient asking if he should change metformin or start on ozempic given elevated blood sugar levels.     B-(background): Patient currently taking metFORMIN (GLUCOPHAGE) 500 MG tablet. Listed as twice daily, patient did not confirm this with RN over phone.     A-(assessment): Patient last checked blood sugar at 9am this morning. Blood sugar was reported as 190mg/dL. Patient last took metformin at 9:30am. No symptoms were reported to triage RN before patient ended call.     R-(recommendations): RN advised that an appointment would be needed to discuss medication changes. Patient notes he has an appointment scheduled for November, patient inquiring if there are any earlier appointments available. RN offered to transfer patient to central scheduling to assist in looking for available appointment, patient declined. Patient is asking what medication changes to make now. RN advised patient that nurses cannot advise on medication changes, would need to discuss with a provider. RN again offered to transfer to central scheduling if wanting sooner appointment to discuss medications. Patient declines. RN attempted to complete triage with patient regarding elevated blood glucose levels. Patient ended triage call, stating \"that's enough questions\".       Reason for Disposition   Blood glucose  mg/dL (3.9 -13.3 mmol/L)    Additional Information   Negative: Unconscious or difficult to awaken   Negative: Acting confused (e.g., disoriented, slurred speech)   Negative: Very weak (can't stand)   Negative: Sounds like a life-threatening emergency to the triager   Negative: Vomiting and signs of dehydration (e.g., very dry mouth, lightheaded, dark urine)   Negative: Blood glucose > 240 mg/dL (13.3 mmol/L) and rapid breathing   Negative: Blood glucose > 500 mg/dL (27.8 mmol/L)   Negative: Blood glucose > 240 mg/dL (13.3 mmol/L) AND urine ketones moderate-large (or more than 1+)   Negative: Blood " "glucose > 240 mg/dL (13.3 mmol/L) and blood ketones > 1.4 mmol/L   Negative: Blood glucose > 240 mg/dL (13.3 mmol/L) AND vomiting AND unable to check for ketones (in blood or urine)   Negative: Vomiting lasting > 4 hours   Negative: Patient sounds very sick or weak to the triager   Negative: Fever > 100.4 F (38.0 C)   Negative: Caller has URGENT medication or insulin pump question and triager unable to answer question   Negative: Blood glucose > 400 mg/dL (22.2 mmol/L)   Negative: Blood glucose > 300 mg/dL (16.7 mmol/L) AND two or more times in a row   Negative: Urine ketones moderate - large (or blood ketones > 1.4 mmol/L)   Negative: New-onset diabetes mellitus suspected (e.g., frequent urination, weak, weight loss)   Negative: Symptoms of high blood sugar (e.g., frequent urination, weak, weight loss) and not able to test blood glucose   Negative: Patient wants to be seen   Negative: Caller has NON-URGENT medication question about med that PCP prescribed and triager unable to answer question   Negative: Blood glucose > 300 mg/dL (16.7 mmol/L)   Negative: Blood glucose 240 - 300 mg/dL (13.3 - 16.7 mmol/L)    Answer Assessment - Initial Assessment Questions  1. BLOOD GLUCOSE: \"What is your blood glucose level?\"       190   2. ONSET: \"When did you check the blood glucose?\"      9am this morning   3. USUAL RANGE: \"What is your glucose level usually?\" (e.g., usual fasting morning value, usual evening value)      Normally high  4. KETONES: \"Do you check for ketones (urine or blood test strips)?\" If yes, ask: \"What does the test show now?\"       No   5. TYPE 1 or 2:  \"Do you know what type of diabetes you have?\"  (e.g., Type 1, Type 2, Gestational; doesn't know)       Type 2  6. INSULIN: \"Do you take insulin?\" \"What type of insulin(s) do you use? What is the mode of delivery? (syringe, pen; injection or pump)?\"       No insulin  7. DIABETES PILLS: \"Do you take any pills for your diabetes?\" If yes, ask: \"Have you missed " "taking any pills recently?\"      Metformin. No missed doses. Took metformin last at 9:31am.   8. OTHER SYMPTOMS: \"Do you have any symptoms?\" (e.g., fever, frequent urination, difficulty breathing, dizziness, weakness, vomiting)      No fever. No frequent urination. No breathing. Patient ended triage at this time.   9. PREGNANCY: \"Is there any chance you are pregnant?\" \"When was your last menstrual period?\"      no    Protocols used: Diabetes - High Blood Sugar-JUAN AEMILIE CURIEL RN 8/15/2023 at 12:02 PM    "

## 2023-08-27 ENCOUNTER — HEALTH MAINTENANCE LETTER (OUTPATIENT)
Age: 78
End: 2023-08-27

## 2023-11-05 ENCOUNTER — HEALTH MAINTENANCE LETTER (OUTPATIENT)
Age: 78
End: 2023-11-05

## 2024-03-24 ENCOUNTER — HEALTH MAINTENANCE LETTER (OUTPATIENT)
Age: 79
End: 2024-03-24

## 2024-08-11 ENCOUNTER — HEALTH MAINTENANCE LETTER (OUTPATIENT)
Age: 79
End: 2024-08-11

## 2024-10-20 ENCOUNTER — HEALTH MAINTENANCE LETTER (OUTPATIENT)
Age: 79
End: 2024-10-20

## 2024-12-22 ENCOUNTER — HEALTH MAINTENANCE LETTER (OUTPATIENT)
Age: 79
End: 2024-12-22

## 2025-03-23 ENCOUNTER — HEALTH MAINTENANCE LETTER (OUTPATIENT)
Age: 80
End: 2025-03-23

## 2025-07-06 ENCOUNTER — HEALTH MAINTENANCE LETTER (OUTPATIENT)
Age: 80
End: 2025-07-06

## 2025-07-15 NOTE — ED NOTES
Bed: ED40  Expected date:   Expected time:   Means of arrival: Ambulance  Comments:  BV1. 74M. alex   good balance